# Patient Record
Sex: MALE | Race: WHITE | NOT HISPANIC OR LATINO | Employment: FULL TIME | URBAN - METROPOLITAN AREA
[De-identification: names, ages, dates, MRNs, and addresses within clinical notes are randomized per-mention and may not be internally consistent; named-entity substitution may affect disease eponyms.]

---

## 2017-05-15 ENCOUNTER — OFFICE VISIT (OUTPATIENT)
Dept: FAMILY MEDICINE | Facility: CLINIC | Age: 57
End: 2017-05-15
Payer: COMMERCIAL

## 2017-05-15 VITALS
SYSTOLIC BLOOD PRESSURE: 127 MMHG | WEIGHT: 194.6 LBS | DIASTOLIC BLOOD PRESSURE: 85 MMHG | HEART RATE: 63 BPM | TEMPERATURE: 97.5 F | BODY MASS INDEX: 27.86 KG/M2 | HEIGHT: 70 IN

## 2017-05-15 DIAGNOSIS — M76.61 ACHILLES TENDINITIS OF RIGHT LOWER EXTREMITY: Primary | ICD-10-CM

## 2017-05-15 PROCEDURE — 99213 OFFICE O/P EST LOW 20 MIN: CPT | Performed by: FAMILY MEDICINE

## 2017-05-15 NOTE — PROGRESS NOTES
"SUBJECTIVE:  Edin Padilla is a 56 year old male who sustained a left foot pain behind ankle   Following acute injury.  Mechanism of injury: 10 days   Immediate symptoms: immediate pain.   Symptoms have been improving since that time.  Pain Quality: Sharp  Modifying Factors:     Pain Improved with:rest    Pain Worsened with: standing on tip toes    Prior history of related problems: no prior problems with this area in the past.    Past Medical, social, family histories, medications, and allergies reviewed and updated    OBJECTIVE:  Blood pressure 127/85, pulse 63, temperature 97.5  F (36.4  C), temperature source Oral, height 5' 10\" (1.778 m), weight 194 lb 9.6 oz (88.3 kg).  Appearance: in no apparent distress and well developed and well nourished.  Foot/ankle exam.  .posterior ankle tender at achilles insertion.  Full range of motion     ICD-10-CM    1. Achilles tendinitis of right lower extremity M76.61     PLAN:Adams exercises    "

## 2017-05-15 NOTE — NURSING NOTE
"Chief Complaint   Patient presents with     Musculoskeletal Problem     right foot x 1 week, possible injury        Initial /85  Pulse 63  Temp 97.5  F (36.4  C) (Oral)  Ht 5' 10\" (1.778 m)  Wt 194 lb 9.6 oz (88.3 kg)  BMI 27.92 kg/m2 Estimated body mass index is 27.92 kg/(m^2) as calculated from the following:    Height as of this encounter: 5' 10\" (1.778 m).    Weight as of this encounter: 194 lb 9.6 oz (88.3 kg).  Medication Reconciliation: complete  Emilio Chin CMA    "

## 2017-05-15 NOTE — MR AVS SNAPSHOT
"              After Visit Summary   5/15/2017    Edin Padilla    MRN: [id number]           Patient Information     Date Of Birth          1960        Visit Information        Provider Department      5/15/2017 11:00 AM Amado Watson MD [facility]        Today's Diagnoses     Achilles tendinitis of right lower extremity    -  1       Follow-ups after your visit        Who to contact     If you have questions or need follow up information about today's clinic visit or your schedule please contact [facility] directly at [phone].  Normal or non-critical lab and imaging results will be communicated to you by "[employer]hart, letter or phone within 4 business days after the clinic has received the results. If you do not hear from us within 7 days, please contact the clinic through [employer] or phone. If you have a critical or abnormal lab result, we will notify you by phone as soon as possible.  Submit refill requests through [employer] or call your pharmacy and they will forward the refill request to us. Please allow 3 business days for your refill to be completed.          Additional Information About Your Visit        MyChart Information     Rev[employer] lets you send messages to your doctor, view your test results, renew your prescriptions, schedule appointments and more. To sign up, go to [email or web address] . Click on \"Log in\" on the left side of the screen, which will take you to the Welcome page. Then click on \"Sign up Now\" on the right side of the page.     You will be asked to enter the access code listed below, as well as some personal information. Please follow the directions to create your username and password.     Your access code is: [email or web address]  Expires: 2017 11:28 AM     Your access code will  in 90 days. If you need help or a new code, please call your [facility] or [phone].        Care EveryWhere ID     This is your Care EveryWhere ID. This " "could be used by other organizations to access your [place] medical records  [id number]        Your Vitals Were     Pulse Temperature Height BMI (Body Mass Index)          63 97.5  F (36.4  C) (Oral) 5' 10\" (1.778 m) 27.92 kg/m2         Blood Pressure from Last 3 Encounters:   05/15/17 127/85   05/28/15 124/80   12/17/14 142/87    Weight from Last 3 Encounters:   05/15/17 194 lb 9.6 oz (88.3 kg)   05/28/15 195 lb (88.5 kg)   12/17/14 190 lb (86.2 kg)              Today, you had the following     No orders found for display       Primary Care Provider Office Phone # Fax #    Amado Watson [phone]       [facility] [address] [facility] [address]        Thank you!     Thank you for choosing [facility]  for your care. Our goal is always to provide you with excellent care. Hearing back from our patients is one way we can continue to improve our services. Please take a few minutes to complete the written survey that you may receive in the mail after your visit with us. Thank you!             Your Updated Medication List - Protect others around you: Learn how to safely use, store and throw away your medicines at www.disposemymeds.org.      Notice  As of 5/15/2017 11:28 AM    You have not been prescribed any medications.      "

## 2017-05-22 ENCOUNTER — OFFICE VISIT (OUTPATIENT)
Dept: FAMILY MEDICINE | Facility: CLINIC | Age: 57
End: 2017-05-22
Payer: COMMERCIAL

## 2017-05-22 VITALS
DIASTOLIC BLOOD PRESSURE: 84 MMHG | BODY MASS INDEX: 27.75 KG/M2 | HEART RATE: 70 BPM | SYSTOLIC BLOOD PRESSURE: 127 MMHG | TEMPERATURE: 97.4 F | WEIGHT: 193.4 LBS

## 2017-05-22 DIAGNOSIS — Z23 NEED FOR TDAP VACCINATION: ICD-10-CM

## 2017-05-22 DIAGNOSIS — Z11.59 NEED FOR HEPATITIS C SCREENING TEST: ICD-10-CM

## 2017-05-22 DIAGNOSIS — M76.61 ACHILLES TENDINITIS OF RIGHT LOWER EXTREMITY: ICD-10-CM

## 2017-05-22 DIAGNOSIS — M10.9 ACUTE GOUT INVOLVING TOE OF RIGHT FOOT, UNSPECIFIED CAUSE: Primary | ICD-10-CM

## 2017-05-22 LAB — URATE SERPL-MCNC: 6.3 MG/DL (ref 3.5–7.2)

## 2017-05-22 PROCEDURE — 90471 IMMUNIZATION ADMIN: CPT | Performed by: FAMILY MEDICINE

## 2017-05-22 PROCEDURE — 90715 TDAP VACCINE 7 YRS/> IM: CPT | Performed by: FAMILY MEDICINE

## 2017-05-22 PROCEDURE — 84550 ASSAY OF BLOOD/URIC ACID: CPT | Performed by: FAMILY MEDICINE

## 2017-05-22 PROCEDURE — 99214 OFFICE O/P EST MOD 30 MIN: CPT | Mod: 25 | Performed by: FAMILY MEDICINE

## 2017-05-22 PROCEDURE — 36415 COLL VENOUS BLD VENIPUNCTURE: CPT | Performed by: FAMILY MEDICINE

## 2017-05-22 PROCEDURE — 86803 HEPATITIS C AB TEST: CPT | Performed by: FAMILY MEDICINE

## 2017-05-22 RX ORDER — INDOMETHACIN 25 MG/1
25 CAPSULE ORAL 2 TIMES DAILY WITH MEALS
Qty: 42 CAPSULE | Refills: 1 | Status: SHIPPED | OUTPATIENT
Start: 2017-05-22 | End: 2018-01-23

## 2017-05-22 NOTE — PROGRESS NOTES
SUBJECTIVE:  56 year old.The patient has a history of right foot and joint swelling and discomfort.  The patient was seen on 5/15/2017 for achilles tendonitis of the right lower extremity and was started on summit exercises.  Around 12 hours after being seen the patient had the onset of swelling to the joint that was quite painful.  It has started to improve but is still present.  The patient believes his symptoms started after doing [place] foundation [place].  Currently the patient denies any heel pain but states it does hurt when walking.  Currently the patient rates his toe pain as 4/10 when present but notes it has gotten better.  The patient has had some numbness and tingling in his right toes that has been going on for a week.  The patient also notes that his urine has been darker than usual and he has been pushing fluids.  ROS denies any fevers, cough, shortness of breath, abdominal pain, flank pain, change in bowel movements or other complaints.       Reviewed health maintenance  Patient Active Problem List   Diagnosis     CARDIOVASCULAR SCREENING; LDL GOAL LESS THAN 160     GERD (gastroesophageal reflux disease)     New onset atrial fibrillation (H)     Esophageal reflux     Advanced directives, counseling/discussion     Past Medical History:   Diagnosis Date     A-fib (H) 2008     History of cardioversion 2008, 2012     Vertigo        OBJECTIVE:  no apparent distress  /84  Pulse 70  Temp 97.4  F (36.3  C) (Oral)  Wt 193 lb 6.4 oz (87.7 kg)  BMI 27.75 kg/m2    Musc: Redness and swelling to the right great toe joint.  Tenderness to palpation.  No redness in other areas.  Left foot is unremarkable.        ICD-10-CM    1. Acute gout involving toe of right foot, unspecified cause M10.9 Uric acid     indomethacin (INDOCIN) 25 MG capsule   2. Need for hepatitis C screening test Z11.59 Hepatitis C Screen Reflex to HCV RNA Quant and Genotype   3. Achilles tendinitis of right lower extremity M76.61     4. Need for Tdap vaccination Z23 VACCINE ADMINISTRATION, INITIAL     TDAP VACCINE (ADACEL)     CANCELED: DTAP IMMUNIZATION (<7Y), IM      PLAN: indomethacin trial and await uric acid level     I, August Coto, am serving as a scribe on 5/22/2017 at 2:19 PM to personally document services performed by Dr. Watson based on my observations and the provider's statements to me.  I agree with the above plan  Amado Watson MD       Screening Questionnaire for Adult Immunization    Are you sick today?   No   Do you have allergies to medications, food, a vaccine component or latex?   No   Have you ever had a serious reaction after receiving a vaccination?   No   Do you have a long-term health problem with heart disease, lung disease, asthma, kidney disease, metabolic disease (e.g. diabetes), anemia, or other blood disorder?   No   Do you have cancer, leukemia, HIV/AIDS, or any other immune system problem?   No   In the past 3 months, have you taken medications that affect  your immune system, such as prednisone, other steroids, or anticancer drugs; drugs for the treatment of rheumatoid arthritis, Crohn s disease, or psoriasis; or have you had radiation treatments?   No   Have you had a seizure, or a brain or other nervous system problem?   No   During the past year, have you received a transfusion of blood or blood     products, or been given immune (gamma) globulin or antiviral drug?   No   For women: Are you pregnant or is there a chance you could become        pregnant during the next month?   No   Have you received any vaccinations in the past 4 weeks?   No     Immunization questionnaire answers were all negative.      MNVFC doesn't apply on this patient           Screening performed by Becca Rouse on 5/22/2017 at 2:47 PM.

## 2017-05-22 NOTE — NURSING NOTE
"Chief Complaint   Patient presents with     Musculoskeletal Problem     right sided foot pain, concerns with possible gout, heel pain not improved        Initial /84  Pulse 70  Temp 97.4  F (36.3  C) (Oral)  Wt 193 lb 6.4 oz (87.7 kg)  BMI 27.75 kg/m2 Estimated body mass index is 27.75 kg/(m^2) as calculated from the following:    Height as of 5/15/17: 5' 10\" (1.778 m).    Weight as of this encounter: 193 lb 6.4 oz (87.7 kg).  Medication Reconciliation: complete  Emilio Chin CMA    "

## 2017-05-22 NOTE — MR AVS SNAPSHOT
"              After Visit Summary   2017    Edin Padilla    MRN: [id number]           Patient Information     Date Of Birth          1960        Visit Information        Provider Department      2017 2:15 PM Amado Watson MD [facility]        Today's Diagnoses     Acute gout involving toe of right foot, unspecified cause    -  1    Need for hepatitis C screening test        Achilles tendinitis of right lower extremity        Need for Tdap vaccination           Follow-ups after your visit        Who to contact     If you have questions or need follow up information about today's clinic visit or your schedule please contact [facility] directly at [phone].  Normal or non-critical lab and imaging results will be communicated to you by MyChart, letter or phone within 4 business days after the clinic has received the results. If you do not hear from us within 7 days, please contact the clinic through MyChart or phone. If you have a critical or abnormal lab result, we will notify you by phone as soon as possible.  Submit refill requests through [employer] or call your pharmacy and they will forward the refill request to us. Please allow 3 business days for your refill to be completed.          Additional Information About Your Visit        MyChart Information     [employer] lets you send messages to your doctor, view your test results, renew your prescriptions, schedule appointments and more. To sign up, go to [email or web address] . Click on \"Log in\" on the left side of the screen, which will take you to the Welcome page. Then click on \"Sign up Now\" on the right side of the page.     You will be asked to enter the access code listed below, as well as some personal information. Please follow the directions to create your username and password.     Your access code is: [email or web address]  Expires: 2017 11:28 AM     Your access code will  in 90 days. If you need " help or a new code, please call your [place] clinic or [phone].        Care EveryWhere ID     This is your Care EveryWhere ID. This could be used by other organizations to access your [place] medical records  [id number]        Your Vitals Were     Pulse Temperature BMI (Body Mass Index)             70 97.4  F (36.3  C) (Oral) 27.75 kg/m2          Blood Pressure from Last 3 Encounters:   05/22/17 127/84   05/15/17 127/85   05/28/15 124/80    Weight from Last 3 Encounters:   05/22/17 193 lb 6.4 oz (87.7 kg)   05/15/17 194 lb 9.6 oz (88.3 kg)   05/28/15 195 lb (88.5 kg)              We Performed the Following     Hepatitis C Screen Reflex to HCV RNA Quant and Genotype     TDAP VACCINE (ADACEL)     Uric acid     VACCINE ADMINISTRATION, INITIAL          Today's Medication Changes          These changes are accurate as of: 5/22/17  4:36 PM.  If you have any questions, ask your nurse or doctor.               Start taking these medicines.        Dose/Directions    indomethacin 25 MG capsule   Commonly known as:  INDOCIN   Used for:  Acute gout involving toe of right foot, unspecified cause   Started by:  Amado Watson MD        Dose:  25 mg   Take 1 capsule (25 mg) by mouth 2 times daily (with meals)   Quantity:  42 capsule   Refills:  1            Where to get your medicines      These medications were sent to [facility] [id number] [place] [address], [address]  [address] [address] [address], [facility] [address]     Phone:  [phone]     indomethacin 25 MG capsule                Primary Care Provider Office Phone # Fax #    Amado Watson [phone]       [facility] [address] [facility] [address]        Thank you!     Thank you for choosing [facility]  for your care. Our goal is always to provide you with excellent care. Hearing back from our patients is one way we can continue to improve our services. Please take a few minutes  to complete the written survey that you may receive in the mail after your visit with us. Thank you!             Your Updated Medication List - Protect others around you: Learn how to safely use, store and throw away your medicines at www.disposemymeds.org.          This list is accurate as of: 5/22/17  4:36 PM.  Always use your most recent med list.                   Brand Name Dispense Instructions for use    indomethacin 25 MG capsule    INDOCIN    42 capsule    Take 1 capsule (25 mg) by mouth 2 times daily (with meals)

## 2017-05-22 NOTE — LETTER
[facility]  [address] [facility] [address] [phone]        May 25, 2017    Edin Padilla  [address] [facility] [address]            Dear Edin,    The results of your recent tests were normal.  Below is a copy of the results.  It was a pleasure to see you at your last appointment.    If you have any questions or concerns, please call myself or my nurse at [phone].    Sincerely,    Amado Watson MD/ks    Results for orders placed or performed in visit on 05/22/17   Hepatitis C Screen Reflex to HCV RNA Quant and Genotype   Result Value Ref Range    Hepatitis C Antibody  NR     Nonreactive   Assay performance characteristics have not been established for newborns,   infants, and children     Uric acid   Result Value Ref Range    Uric Acid 6.3 3.5 - 7.2 mg/dL

## 2017-05-23 LAB — HCV AB SERPL QL IA: NORMAL

## 2018-01-23 ENCOUNTER — OFFICE VISIT (OUTPATIENT)
Dept: OPTOMETRY | Facility: CLINIC | Age: 58
End: 2018-01-23
Payer: COMMERCIAL

## 2018-01-23 DIAGNOSIS — H52.4 PRESBYOPIA: ICD-10-CM

## 2018-01-23 DIAGNOSIS — H52.03 HYPEROPIA, BILATERAL: Primary | ICD-10-CM

## 2018-01-23 PROCEDURE — 92014 COMPRE OPH EXAM EST PT 1/>: CPT | Performed by: OPTOMETRIST

## 2018-01-23 PROCEDURE — 92015 DETERMINE REFRACTIVE STATE: CPT | Performed by: OPTOMETRIST

## 2018-01-23 ASSESSMENT — REFRACTION_MANIFEST
OS_ADD: +2.00
OS_SPHERE: +1.25
OS_CYLINDER: +0.50
OD_SPHERE: +2.00
OD_CYLINDER: SPHERE
OD_ADD: +2.00
OS_AXIS: 155
OD_AXIS: 012
OD_SPHERE: +1.75
OS_AXIS: 151
OS_SPHERE: +1.25
METHOD_AUTOREFRACTION: 1
OS_CYLINDER: +0.25
OD_CYLINDER: +0.25

## 2018-01-23 ASSESSMENT — VISUAL ACUITY
OS_CC: 20/20-1
OS_SC+: -1
OS_CC: 20/20
OD_SC: 20/60
OD_CC: 20/20
METHOD: SNELLEN - LINEAR
OS_SC: 20/200
OD_CC: 20/20-2
CORRECTION_TYPE: GLASSES
OD_SC: 20/200
OS_SC: 20/50
OD_SC+: -1

## 2018-01-23 ASSESSMENT — REFRACTION_WEARINGRX
SPECS_TYPE: PAL
OS_SPHERE: +1.25
OS_ADD: +2.00
OD_SPHERE: +1.50
OD_ADD: +2.00
OS_CYLINDER: SPHERE
OD_CYLINDER: SPHERE

## 2018-01-23 ASSESSMENT — TONOMETRY
OD_IOP_MMHG: 16
IOP_METHOD: APPLANATION

## 2018-01-23 ASSESSMENT — KERATOMETRY
OS_K1POWER_DIOPTERS: 42.25
OD_AXISANGLE2_DEGREES: 174
OS_AXISANGLE2_DEGREES: 11
OS_K2POWER_DIOPTERS: 42.75
OD_K1POWER_DIOPTERS: 42.25
OD_K2POWER_DIOPTERS: 42.75

## 2018-01-23 ASSESSMENT — CUP TO DISC RATIO
OD_RATIO: 0.2
OS_RATIO: 0.2

## 2018-01-23 ASSESSMENT — CONF VISUAL FIELD
OS_NORMAL: 1
METHOD: COUNTING FINGERS
OD_NORMAL: 1

## 2018-01-23 ASSESSMENT — SLIT LAMP EXAM - LIDS
COMMENTS: NORMAL
COMMENTS: NORMAL

## 2018-01-23 ASSESSMENT — EXTERNAL EXAM - RIGHT EYE: OD_EXAM: NORMAL

## 2018-01-23 ASSESSMENT — EXTERNAL EXAM - LEFT EYE: OS_EXAM: NORMAL

## 2018-01-23 NOTE — MR AVS SNAPSHOT
"              After Visit Summary   1/23/2018    Edin Padilla    MRN: [id number]           Patient Information     Date Of Birth          1960        Visit Information        Provider Department      1/23/2018 8:00 AM Ktai Petty, [facility]        Today's Diagnoses     Hyperopia, bilateral    -  1    Presbyopia          Care Instructions    Patient was advised of today's exam findings.  Fill glasses prescription  Allow 2 weeks to adapt to change in glasses  Use over the counter artificial tears 2 times a day as needed (Thera Tears, Systane Ultra or Refresh Optive)    Return in 1 year for eye exam    Kati Petty O.D.  [facility]   [address] [place], [place] [id number] [phone]            Follow-ups after your visit        Who to contact     If you have questions or need follow up information about today's clinic visit or your schedule please contact [facility] directly at [phone].  Normal or non-critical lab and imaging results will be communicated to you by [employer], letter or phone within 4 business days after the clinic has received the results. If you do not hear from us within 7 days, please contact the clinic through [employer] or phone. If you have a critical or abnormal lab result, we will notify you by phone as soon as possible.  Submit refill requests through [employer] or call your pharmacy and they will forward the refill request to us. Please allow 3 business days for your refill to be completed.          Additional Information About Your Visit        [employer] Information     [employer] lets you send messages to your doctor, view your test results, renew your prescriptions, schedule appointments and more. To sign up, go to [email or web address] . Click on \"Log in\" on the left side of the screen, which will take you to the Welcome page. Then click on \"Sign up Now\" on the right side of the page.     You will be asked to enter the access code " listed below, as well as some personal information. Please follow the directions to create your username and password.     Your access code is: [email or web address]  Expires: 2018  8:40 AM     Your access code will  in 90 days. If you need help or a new code, please call your [place] clinic or [phone].        Care EveryWhere ID     This is your Care EveryWhere ID. This could be used by other organizations to access your [place] medical records  [id number]         Blood Pressure from Last 3 Encounters:   17 127/84   05/15/17 127/85   05/28/15 124/80    Weight from Last 3 Encounters:   17 87.7 kg (193 lb 6.4 oz)   05/15/17 88.3 kg (194 lb 9.6 oz)   05/28/15 88.5 kg (195 lb)              Today, you had the following     No orders found for display       Primary Care Provider Office Phone # Fax #    Amado Watson [phone] [address] [facility] [address]        Equal Access to Services     [facility] HeartCHARLENE : Hadii aad ku hadasho Soomaali, waaxda luqadaha, qaybta kaalmada adeegyada, caleb anaya . So [facility] [phone].    ATENCIÓN: Si habla español, tiene a suggs disposición servicios gratuitos de asistencia lingüística. Llame al [phone].    We comply with applicable federal civil rights laws and [place] laws. We do not discriminate on the basis of race, color, national origin, age, disability, sex, sexual orientation, or gender identity.            Thank you!     Thank you for choosing [facility]  for your care. Our goal is always to provide you with excellent care. Hearing back from our patients is one way we can continue to improve our services. Please take a few minutes to complete the written survey that you may receive in the mail after your visit with us. Thank you!             Your Updated Medication List - Protect others around you: Learn how to safely use, store and throw away your medicines at  www.disposemymeds.org.      Notice  As of 1/23/2018  8:40 AM    You have not been prescribed any medications.

## 2018-01-23 NOTE — PROGRESS NOTES
Chief Complaint   Patient presents with     COMPREHENSIVE EYE EXAM         Last Eye Exam: 1/7/2015  Dilated Previously: Yes    What are you currently using to see?  Glasses or OTC Readers        Distance Vision Acuity: Noticed gradual change in both eyes    Near Vision Acuity: Satisfied with vision while reading and using computer with glasses, he thinks that there could have been a slight change     Eye Comfort: good and dry at times with very cold weather  Do you use eye drops? 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       OBJECTIVE: See Ophthalmology exam    ASSESSMENT:    ICD-10-CM    1. Hyperopia, bilateral H52.03 EYE EXAM (SIMPLE-NONBILLABLE)     REFRACTION   2. Presbyopia H52.4 EYE EXAM (SIMPLE-NONBILLABLE)     REFRACTION      PLAN:     Patient Instructions   Patient was advised of today's exam findings.  Fill glasses prescription  Allow 2 weeks to adapt to change in glasses  Use over the counter artificial tears 2 times a day as needed (Thera Tears, Systane Ultra or Refresh Optive)    Return in 1 year for eye exam    Kati Petty O.D.  [facility]   [address] [place] [address] [phone]

## 2018-01-23 NOTE — PATIENT INSTRUCTIONS
Patient was advised of today's exam findings.  Fill glasses prescription  Allow 2 weeks to adapt to change in glasses  Use over the counter artificial tears 2 times a day as needed (Thera Tears, Systane Ultra or Refresh Optive)    Return in 1 year for eye exam    Kati Petty O.D.  [facility]   [address] [place] [address] [phone]

## 2018-01-23 NOTE — LETTER
1/23/2018         RE: Edin Padilla  [address] [facility] [address]        Dear Colleague,    Thank you for referring your patient, Edin Padilla, to the [facility]. Please see a copy of my visit note below.    Chief Complaint   Patient presents with     COMPREHENSIVE EYE EXAM         Last Eye Exam: 1/7/2015  Dilated Previously: Yes    What are you currently using to see?  Glasses or OTC Readers        Distance Vision Acuity: Noticed gradual change in both eyes    Near Vision Acuity: Satisfied with vision while reading and using computer with glasses, he thinks that there could have been a slight change     Eye Comfort: good and dry at times with very cold weather  Do you use eye drops? 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       OBJECTIVE: See Ophthalmology exam    ASSESSMENT:    ICD-10-CM    1. Hyperopia, bilateral H52.03 EYE EXAM (SIMPLE-NONBILLABLE)     REFRACTION   2. Presbyopia H52.4 EYE EXAM (SIMPLE-NONBILLABLE)     REFRACTION      PLAN:     Patient Instructions   Patient was advised of today's exam findings.  Fill glasses prescription  Allow 2 weeks to adapt to change in glasses  Use over the counter artificial tears 2 times a day as needed (Thera Tears, Systane Ultra or Refresh Optive)    Return in 1 year for eye exam    Kati Petty O.D.  [facility]   [address] [place], [place] [id number] [phone]           Again, thank you for allowing me to participate in the care of your patient.        Sincerely,        Kati Petty, OD

## 2018-08-02 ENCOUNTER — OFFICE VISIT (OUTPATIENT)
Dept: FAMILY MEDICINE | Facility: CLINIC | Age: 58
End: 2018-08-02
Payer: COMMERCIAL

## 2018-08-02 VITALS
BODY MASS INDEX: 26.46 KG/M2 | WEIGHT: 189 LBS | OXYGEN SATURATION: 100 % | TEMPERATURE: 97.1 F | HEIGHT: 71 IN | DIASTOLIC BLOOD PRESSURE: 79 MMHG | RESPIRATION RATE: 16 BRPM | SYSTOLIC BLOOD PRESSURE: 123 MMHG | HEART RATE: 54 BPM

## 2018-08-02 DIAGNOSIS — Z13.220 LIPID SCREENING: ICD-10-CM

## 2018-08-02 DIAGNOSIS — R35.0 URINARY FREQUENCY: ICD-10-CM

## 2018-08-02 DIAGNOSIS — Z00.00 ROUTINE GENERAL MEDICAL EXAMINATION AT A HEALTH CARE FACILITY: Primary | ICD-10-CM

## 2018-08-02 DIAGNOSIS — Z13.1 SCREENING FOR DIABETES MELLITUS: ICD-10-CM
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
## 2018-08-02 DIAGNOSIS — K21.9 GASTROESOPHAGEAL REFLUX DISEASE WITHOUT ESOPHAGITIS: ICD-10-CM

## 2018-08-02 LAB
ALBUMIN UR-MCNC: NEGATIVE MG/DL
ANION GAP SERPL CALCULATED.3IONS-SCNC: 8 MMOL/L (ref 3–14)
APPEARANCE UR: CLEAR
BILIRUB UR QL STRIP: NEGATIVE
BUN SERPL-MCNC: 16 MG/DL (ref 7–30)
CALCIUM SERPL-MCNC: 9.3 MG/DL (ref 8.5–10.1)
CHLORIDE SERPL-SCNC: 104 MMOL/L (ref 94–109)
CHOLEST SERPL-MCNC: 176 MG/DL
CO2 SERPL-SCNC: 26 MMOL/L (ref 20–32)
COLOR UR AUTO: YELLOW
CREAT SERPL-MCNC: 1.02 MG/DL (ref 0.66–1.25)
GFR SERPL CREATININE-BSD FRML MDRD: 75 ML/MIN/1.7M2
GLUCOSE SERPL-MCNC: 90 MG/DL (ref 70–99)
GLUCOSE UR STRIP-MCNC: NEGATIVE MG/DL
HDLC SERPL-MCNC: 70 MG/DL
HGB UR QL STRIP: NEGATIVE
KETONES UR STRIP-MCNC: NEGATIVE MG/DL
LDLC SERPL CALC-MCNC: 97 MG/DL
LEUKOCYTE ESTERASE UR QL STRIP: NEGATIVE
MUCOUS THREADS #/AREA URNS LPF: PRESENT /LPF
NITRATE UR QL: NEGATIVE
NONHDLC SERPL-MCNC: 106 MG/DL
PH UR STRIP: 7.5 PH (ref 5–7)
POTASSIUM SERPL-SCNC: 4.5 MMOL/L (ref 3.4–5.3)
PSA SERPL-ACNC: 0.95 UG/L (ref 0–4)
RBC #/AREA URNS AUTO: ABNORMAL /HPF
SODIUM SERPL-SCNC: 138 MMOL/L (ref 133–144)
SOURCE: ABNORMAL
SP GR UR STRIP: 1.01 (ref 1–1.03)
TRIGL SERPL-MCNC: 46 MG/DL
UROBILINOGEN UR STRIP-ACNC: 0.2 EU/DL (ref 0.2–1)
WBC #/AREA URNS AUTO: ABNORMAL /HPF

## 2018-08-02 PROCEDURE — 80061 LIPID PANEL: CPT | Performed by: FAMILY MEDICINE

## 2018-08-02 PROCEDURE — G0103 PSA SCREENING: HCPCS | Performed by: FAMILY MEDICINE

## 2018-08-02 PROCEDURE — 80048 BASIC METABOLIC PNL TOTAL CA: CPT | Performed by: FAMILY MEDICINE

## 2018-08-02 PROCEDURE — 86618 LYME DISEASE ANTIBODY: CPT | Performed by: FAMILY MEDICINE

## 2018-08-02 PROCEDURE — 99396 PREV VISIT EST AGE 40-64: CPT | Performed by: FAMILY MEDICINE

## 2018-08-02 PROCEDURE — 99000 SPECIMEN HANDLING OFFICE-LAB: CPT | Performed by: FAMILY MEDICINE

## 2018-08-02 PROCEDURE — 81001 URINALYSIS AUTO W/SCOPE: CPT | Performed by: FAMILY MEDICINE

## 2018-08-02 PROCEDURE — 86617 LYME DISEASE ANTIBODY: CPT | Mod: 90 | Performed by: FAMILY MEDICINE

## 2018-08-02 PROCEDURE — 36415 COLL VENOUS BLD VENIPUNCTURE: CPT | Performed by: FAMILY MEDICINE

## 2018-08-02 RX ORDER — SUCRALFATE 1 G/1
1 TABLET ORAL 4 TIMES DAILY
Qty: 40 TABLET | Refills: 1 | Status: SHIPPED | OUTPATIENT
Start: 2018-08-02 | End: 2019-04-22

## 2018-08-02 ASSESSMENT — ENCOUNTER SYMPTOMS
DYSURIA: 0
CONSTIPATION: 0
DIZZINESS: 0
SORE THROAT: 0
SHORTNESS OF BREATH: 0
CHILLS: 0
JOINT SWELLING: 0
WEAKNESS: 0
PALPITATIONS: 0
EYE PAIN: 0
PARESTHESIAS: 0
ABDOMINAL PAIN: 0
HEMATOCHEZIA: 0
MYALGIAS: 1
HEARTBURN: 1
DIARRHEA: 0
HEADACHES: 0
NAUSEA: 0
ARTHRALGIAS: 0
FREQUENCY: 1
COUGH: 0
HEMATURIA: 0

## 2018-08-02 ASSESSMENT — PAIN SCALES - GENERAL: PAINLEVEL: NO PAIN (0)

## 2018-08-02 NOTE — LETTER
August 6, 2018    Edin Padilla  [id number] [address] [place] [address]            Dear Edin,    The results of your recent tests were normal.  Below is a copy of the results.  It was a pleasure to see you at your last appointment.    If you have any questions or concerns, please call myself or my nurse at [phone].    Sincerely,    Amado Watson MD/shl      Results for orders placed or performed in visit on 08/02/18   Lipid panel reflex to direct LDL Fasting   Result Value Ref Range    Cholesterol 176 <200 mg/dL    Triglycerides 46 <150 mg/dL    HDL Cholesterol 70 >39 mg/dL    LDL Cholesterol Calculated 97 <100 mg/dL    Non HDL Cholesterol 106 <130 mg/dL   Basic metabolic panel  (Ca, Cl, CO2, Creat, Gluc, K, Na, BUN)   Result Value Ref Range    Sodium 138 133 - 144 mmol/L    Potassium 4.5 3.4 - 5.3 mmol/L    Chloride 104 94 - 109 mmol/L    Carbon Dioxide 26 20 - 32 mmol/L    Anion Gap 8 3 - 14 mmol/L    Glucose 90 70 - 99 mg/dL    Urea Nitrogen 16 7 - 30 mg/dL    Creatinine 1.02 0.66 - 1.25 mg/dL    GFR Estimate 75 >60 mL/min/1.7m2    GFR Estimate If Black >90 >60 mL/min/1.7m2    Calcium 9.3 8.5 - 10.1 mg/dL   Lyme Disease Lucy with reflex to WB Serum   Result Value Ref Range    Lyme Disease Antibodies Serum 2.15 (H) 0.00 - 0.89   UA with Microscopic   Result Value Ref Range    Color Urine Yellow     Appearance Urine Clear     Glucose Urine Negative NEG^Negative mg/dL    Bilirubin Urine Negative NEG^Negative    Ketones Urine Negative NEG^Negative mg/dL    Specific Gravity Urine 1.015 1.003 - 1.035    pH Urine 7.5 (H) 5.0 - 7.0 pH    Protein Albumin Urine Negative NEG^Negative mg/dL    Urobilinogen Urine 0.2 0.2 - 1.0 EU/dL    Nitrite Urine Negative NEG^Negative    Blood Urine Negative NEG^Negative    Leukocyte Esterase Urine Negative NEG^Negative    Source Midstream Urine     WBC Urine 0 - 5 OTO5^0 - 5 /HPF    RBC Urine O - 2 OTO2^O - 2 /HPF    Mucous Urine Present (A) NEG^Negative /LPF   PSA, screen    Result Value Ref Range    PSA 0.95 0 - 4 ug/L   Lyme Conf IgG and IgM by Immunoblot   Result Value Ref Range    Lyme Confirm IgG by Immunoblot Negative Negative    Lyme Confirm IgM by Immunoblot Negative Negative

## 2018-08-02 NOTE — NURSING NOTE
"Chief Complaint   Patient presents with     Physical     Health Maintenance     order pended       Initial /79  Pulse 54  Temp 97.1  F (36.2  C) (Oral)  Resp 16  Ht 5' 11\" (1.803 m)  Wt 189 lb (85.7 kg)  SpO2 100%  BMI 26.36 kg/m2 Estimated body mass index is 26.36 kg/(m^2) as calculated from the following:    Height as of this encounter: 5' 11\" (1.803 m).    Weight as of this encounter: 189 lb (85.7 kg).  Medication Reconciliation: complete  Becca Rouse CMA  "

## 2018-08-02 NOTE — MR AVS SNAPSHOT
After Visit Summary   8/2/2018    Edin Padilla    MRN: [id number]           Patient Information     Date Of Birth          1960        Visit Information        Provider Department      8/2/2018 11:30 AM Amado Watson MD [facility]        Today's Diagnoses     Routine general medical examination at a health care facility    -  1    Gastroesophageal reflux disease without esophagitis        Screening for diabetes mellitus        Lipid screening        Tick bite, initial encounter        Urinary frequency          Care Instructions      Preventive Health Recommendations  Male Ages 50 - 64    Yearly exam:             See your health care provider every year in order to  o   Review health changes.   o   Discuss preventive care.    o   Review your medicines if your doctor has prescribed any.     Have a cholesterol test every 5 years, or more frequently if you are at risk for high cholesterol/heart disease.     Have a diabetes test (fasting glucose) every three years. If you are at risk for diabetes, you should have this test more often.     Have a colonoscopy at age 50, or have a yearly FIT test (stool test). These exams will check for colon cancer.      Talk with your health care provider about whether or not a prostate cancer screening test (PSA) is right for you.    You should be tested each year for STDs (sexually transmitted diseases), if you re at risk.     Shots: Get a flu shot each year. Get a tetanus shot every 10 years.     Nutrition:    Eat at least 5 servings of fruits and vegetables daily.     Eat whole-grain bread, whole-wheat pasta and brown rice instead of white grains and rice.     Get adequate Calcium and Vitamin D.     Lifestyle    Exercise for at least 150 minutes a week (30 minutes a day, 5 days a week). This will help you control your weight and prevent disease.     Limit alcohol to one drink per day.     No smoking.     Wear sunscreen to prevent skin  "cancer.     See your dentist every six months for an exam and cleaning.     See your eye doctor every 1 to 2 years.            Follow-ups after your visit        Who to contact     If you have questions or need follow up information about today's clinic visit or your schedule please contact [facility] directly at [phone].  Normal or non-critical lab and imaging results will be communicated to you by MyChart, letter or phone within 4 business days after the clinic has received the results. If you do not hear from us within 7 days, please contact the clinic through [employer] or phone. If you have a critical or abnormal lab result, we will notify you by phone as soon as possible.  Submit refill requests through [employer] or call your pharmacy and they will forward the refill request to us. Please allow 3 business days for your refill to be completed.          Additional Information About Your Visit        MyChart Information     [employer]Big lets you send messages to your doctor, view your test results, renew your prescriptions, schedule appointments and more. To sign up, go to [email or web address] . Click on \"Log in\" on the left side of the screen, which will take you to the Welcome page. Then click on \"Sign up Now\" on the right side of the page.     You will be asked to enter the access code listed below, as well as some personal information. Please follow the directions to create your username and password.     Your access code is: [email or web address]  Expires: 10/31/2018 10:48 AM     Your access code will  in 90 days. If you need help or a new code, please call your [facility] or [phone].        Care EveryWhere ID     This is your Care EveryWhere ID. This could be used by other organizations to access your [facility] medical records  [id number]        Your Vitals Were     Pulse Temperature Respirations Height Pulse Oximetry BMI (Body Mass Index)    54 97.1  F (36.2  C) (Oral) 16 5' 11\" (1.803 " m) 100% 26.36 kg/m2       Blood Pressure from Last 3 Encounters:   08/02/18 123/79   05/22/17 127/84   05/15/17 127/85    Weight from Last 3 Encounters:   08/02/18 189 lb (85.7 kg)   05/22/17 193 lb 6.4 oz (87.7 kg)   05/15/17 194 lb 9.6 oz (88.3 kg)              We Performed the Following     Basic metabolic panel  (Ca, Cl, CO2, Creat, Gluc, K, Na, BUN)     Lipid panel reflex to direct LDL Fasting     Lyme Disease Lucy with reflex to WB Serum     PSA, screen     UA with Microscopic          Today's Medication Changes          These changes are accurate as of 8/2/18 12:51 PM.  If you have any questions, ask your nurse or doctor.               Start taking these medicines.        Dose/Directions    sucralfate 1 GM tablet   Commonly known as:  CARAFATE   Used for:  Gastroesophageal reflux disease without esophagitis   Started by:  Amado Watson MD        Dose:  1 g   Take 1 tablet (1 g) by mouth 4 times daily   Quantity:  40 tablet   Refills:  1            Where to get your medicines      These medications were sent to [facility] [address], [address]  [id number] [place] [id number]     Phone:  [phone]     sucralfate 1 GM tablet                Primary Care Provider Office Phone # Fax #    Amado Watson [phone] [address] [facility] [address]        Equal Access to Services     SANJUANA DESAI : Hadii danielle ku hadasho Soomaali, waaxda luqadaha, qaybta kaalmada adeegyada, waxay jonathon kate. So [facility] [phone].    ATENCIÓN: Si habla español, tiene a suggs disposición servicios gratuitos de asistencia lingüística. Llame al [phone].    We comply with applicable federal civil rights laws and [place] laws. We do not discriminate on the basis of race, color, national origin, age, disability, sex, sexual orientation, or gender identity.            Thank you!     Thank you for choosing [facility]  for your care. Our goal is always to provide you with excellent care. Hearing back from our patients is one way we can continue to improve our services. Please take a few minutes to complete the written survey that you may receive in the mail after your visit with us. Thank you!             Your Updated Medication List - Protect others around you: Learn how to safely use, store and throw away your medicines at www.disposemymeds.org.          This list is accurate as of 8/2/18 12:51 PM.  Always use your most recent med list.                   Brand Name Dispense Instructions for use Diagnosis    sucralfate 1 GM tablet    CARAFATE    40 tablet    Take 1 tablet (1 g) by mouth 4 times daily    Gastroesophageal reflux disease without esophagitis

## 2018-08-02 NOTE — PROGRESS NOTES
"SUBJECTIVE:   CC: Edin Padilla is an 57 year old male who presents for preventative health visit.     Physical   Annual:     Getting at least 3 servings of Calcium per day:  Yes    Bi-annual eye exam:  Yes    Dental care twice a year:  NO    Sleep apnea or symptoms of sleep apnea:  Sleep apnea    Diet:  Regular (no restrictions)    Frequency of exercise:  2-3 days/week    Duration of exercise:  15-30 minutes    Taking medications regularly:  Yes    Medication side effects:  Not applicable    Additional concerns today:  YES        SUBJECTIVE:  57 year old.The patient has a history of gerd for many year.  Symptoms include heartburn. Caffeine intake 1 per day. Etoh none asa or nsaids non smoking n. Better with tums.  Worse with straining  Nausea n vomiting n. Endoscopy y  Reviewed health maintenance   Patient Active Problem List   Diagnosis     CARDIOVASCULAR SCREENING; LDL GOAL LESS THAN 160     GERD (gastroesophageal reflux disease)     New onset atrial fibrillation (H)     Esophageal reflux     Advanced directives, counseling/discussion       AM:601::\"Abdomen soft, non-tender. BS normal. No masses, organomegaly\"}          Today's PHQ-2 Score:   PHQ-2 ( 1999 [employer]) 8/2/2018   Q1: Little interest or pleasure in doing things 0   Q2: Feeling down, depressed or hopeless 0   PHQ-2 Score 0   Q1: Little interest or pleasure in doing things Not at all   Q2: Feeling down, depressed or hopeless Not at all   PHQ-2 Score 0       Abuse: Current or Past(Physical, Sexual or Emotional)- No  Do you feel safe in your environment - Yes    Social History   Substance Use Topics     Smoking status: Never Smoker     Smokeless tobacco: Never Used      Comment: no 2nd hand smoke exposure     Alcohol use 0.0 oz/week     0 Standard drinks or equivalent per week      Comment: 1/2 bottle of wine on Friday and Saturday evenings     Alcohol Use 8/2/2018   If you drink alcohol do you typically have greater than 3 drinks per day OR greater than " "7 drinks per week? No       Last PSA: No results found for: PSA    Reviewed orders with patient. Reviewed health maintenance and updated orders accordingly - Yes       Reviewed and updated as needed this visit by clinical staff  Tobacco  Allergies  Meds  Med Hx  Surg Hx  Fam Hx  Soc Hx        Reviewed and updated as needed this visit by Provider            Review of Systems   Constitutional: Negative for chills.   HENT: Positive for hearing loss. Negative for congestion, ear pain and sore throat.    Eyes: Negative for pain and visual disturbance.   Respiratory: Negative for cough and shortness of breath.    Cardiovascular: Negative for chest pain, palpitations and peripheral edema.   Gastrointestinal: Positive for heartburn. Negative for abdominal pain, constipation, diarrhea, hematochezia and nausea.   Genitourinary: Positive for frequency. Negative for discharge, dysuria, genital sores, hematuria, impotence and urgency.   Musculoskeletal: Positive for myalgias. Negative for arthralgias and joint swelling.   Skin: Negative for rash.   Neurological: Negative for dizziness, weakness, headaches and paresthesias.   Psychiatric/Behavioral: Negative for mood changes.       OBJECTIVE:   /79  Pulse 54  Temp 97.1  F (36.2  C) (Oral)  Resp 16  Ht 5' 11\" (1.803 m)  Wt 189 lb (85.7 kg)  SpO2 100%  BMI 26.36 kg/m2    Physical Exam  GENERAL: healthy, alert and no distress  EYES: Eyes grossly normal to inspection, PERRL and conjunctivae and sclerae normal  HENT: ear canals and TM's normal, nose and mouth without ulcers or lesions  NECK: no adenopathy, no asymmetry, masses, or scars and thyroid normal to palpation  RESP: lungs clear to auscultation - no rales, rhonchi or wheezes  CV: regular rate and rhythm, normal S1 S2, no S3 or S4, no murmur, click or rub, no peripheral edema and peripheral pulses strong  ABDOMEN: soft, nontender, no hepatosplenomegaly, no masses and bowel sounds normal  MS: no gross " "musculoskeletal defects noted, no edema  SKIN: no suspicious lesions or rashes  NEURO: Normal strength and tone, mentation intact and speech normal  PSYCH: mentation appears normal, affect normal/bright    Diagnostic Test Results:  pending    ASSESSMENT/PLAN:       ICD-10-CM    1. Routine general medical examination at a health care facility Z00.00    2. Gastroesophageal reflux disease without esophagitis K21.9    3. Screening for diabetes mellitus Z13.1    4. Lipid screening Z13.220        COUNSELING:   Reviewed preventive health counseling, as reflected in patient instructions       Regular exercise       Healthy diet/nutrition    BP Readings from Last 1 Encounters:   08/02/18 123/79     Estimated body mass index is 26.36 kg/(m^2) as calculated from the following:    Height as of this encounter: 5' 11\" (1.803 m).    Weight as of this encounter: 189 lb (85.7 kg).           reports that he has never smoked. He has never used smokeless tobacco.      Counseling Resources:  ATP IV Guidelines  Pooled Cohorts Equation Calculator  FRAX Risk Assessment  ICSI Preventive Guidelines  Dietary Guidelines for Americans, 2010  USDA's MyPlate  ASA Prophylaxis  Lung CA Screening    Amado Watson MD  [facility]  Answers for HPI/ROS submitted by the patient on 8/2/2018   PHQ-2 Score: 0    Trial of Carafate and consider Dr Oleary  "

## 2018-08-03 LAB — B BURGDOR IGG+IGM SER QL: 2.15 (ref 0–0.89)

## 2018-08-05 LAB
B BURGDOR IGG SER QL IB: NEGATIVE
B BURGDOR IGM SER QL IB: NEGATIVE

## 2019-04-22 ENCOUNTER — OFFICE VISIT (OUTPATIENT)
Dept: FAMILY MEDICINE | Facility: CLINIC | Age: 59
End: 2019-04-22
Payer: COMMERCIAL

## 2019-04-22 ENCOUNTER — ANCILLARY PROCEDURE (OUTPATIENT)
Dept: MRI IMAGING | Facility: CLINIC | Age: 59
End: 2019-04-22
Attending: FAMILY MEDICINE
Payer: COMMERCIAL

## 2019-04-22 VITALS
HEART RATE: 66 BPM | WEIGHT: 188 LBS | BODY MASS INDEX: 26.22 KG/M2 | SYSTOLIC BLOOD PRESSURE: 126 MMHG | DIASTOLIC BLOOD PRESSURE: 77 MMHG | TEMPERATURE: 98.9 F | OXYGEN SATURATION: 99 % | RESPIRATION RATE: 18 BRPM

## 2019-04-22 DIAGNOSIS — K21.00 GASTROESOPHAGEAL REFLUX DISEASE WITH ESOPHAGITIS: ICD-10-CM

## 2019-04-22 DIAGNOSIS — R51.9 NEW ONSET HEADACHE: Primary | ICD-10-CM

## 2019-04-22 DIAGNOSIS — R51.9 NEW ONSET HEADACHE: ICD-10-CM

## 2019-04-22 PROCEDURE — 99214 OFFICE O/P EST MOD 30 MIN: CPT | Performed by: FAMILY MEDICINE

## 2019-04-22 PROCEDURE — 70553 MRI BRAIN STEM W/O & W/DYE: CPT | Mod: TC

## 2019-04-22 PROCEDURE — A9585 GADOBUTROL INJECTION: HCPCS | Mod: JW

## 2019-04-22 RX ORDER — GADOBUTROL 604.72 MG/ML
8 INJECTION INTRAVENOUS ONCE
Status: COMPLETED | OUTPATIENT
Start: 2019-04-22 | End: 2019-04-22

## 2019-04-22 RX ADMIN — GADOBUTROL 8 ML: 604.72 INJECTION INTRAVENOUS at 16:55

## 2019-04-22 ASSESSMENT — PAIN SCALES - GENERAL: PAINLEVEL: NO PAIN (0)

## 2019-04-22 NOTE — NURSING NOTE
"Chief Complaint   Patient presents with     Headache     off and on - x 2 weeks      Dizziness       Initial /77   Pulse 66   Temp 98.9  F (37.2  C) (Oral)   Resp 18   Wt 85.3 kg (188 lb)   SpO2 99%   BMI 26.22 kg/m   Estimated body mass index is 26.22 kg/m  as calculated from the following:    Height as of 8/2/18: 1.803 m (5' 11\").    Weight as of this encounter: 85.3 kg (188 lb).  Medication Reconciliation: complete  Karon Kaplan M.A.    "

## 2019-04-22 NOTE — PROGRESS NOTES
SUBJECTIVE:  58 year old.The patient has a complaint of headaches.  This started 2 days ago. Location posterior quality has to hold the walls to walk.  Lasted 30 minutes Associated symptoms are unsteady and dizzy..  Brought on by unknown  .  Better with going to bed. ROS no chest pain or diaphorese. No visual problems      Reviewed health maintenance  Patient Active Problem List   Diagnosis     CARDIOVASCULAR SCREENING; LDL GOAL LESS THAN 160     GERD (gastroesophageal reflux disease)     New onset atrial fibrillation (H)     Esophageal reflux     Advanced directives, counseling/discussion     Past Medical History:   Diagnosis Date     A-fib (H) 2008     History of cardioversion 2008, 2012     Vertigo        OBJECTIVE:  no apparent distress  /77   Pulse 66   Temp 98.9  F (37.2  C) (Oral)   Resp 18   Wt 85.3 kg (188 lb)   SpO2 99%   BMI 26.22 kg/m    PERRLA and EOM intact  Motor   LUNGS:  CTA B/L, no wheezing or crackles.   Cardiovascular: negative, PMI normal. No lifts, heaves, or thrills. RRR. No murmurs, clicks gallops or rub   Gastrointestinal: Abdomen soft, non-tender. No masses, organomegaly       ICD-10-CM    1. New onset headache R51 MR Brain w/o & w Contrast   2. Gastroesophageal reflux disease with esophagitis K21.0 GENERAL SURG ADULT REFERRAL    PLAN: await MRI if normal Neurology          SUBJECTIVE:  58 year old.The patient has a history of gerd for many year.  Symptoms include heartburn. Caffeine intake one in am. Etoh hardly anything asa or nsaids rearely smoking n. Better with Zinc helps. .  Worse with NSAID'S, not better with antacid.  Not better with prilosec or carafate  Nausea n vomiting n. Endoscopy yes  Reviewed health maintenance   Patient Active Problem List   Diagnosis     CARDIOVASCULAR SCREENING; LDL GOAL LESS THAN 160     GERD (gastroesophageal reflux disease)     New onset atrial fibrillation (H)     Esophageal reflux     Advanced directives, counseling/discussion  "        OBJECTIVE:  Exam:  Constitutional: healthy, alert and no distress  Head: Normocephalic. No masses, lesions, tenderness or abnormalities  Neck: Neck supple. No adenopathy. Thyroid symmetric, normal size,, Carotids without bruits.  ENT: ENT exam normal, no neck nodes or sinus tenderness  Cardiovascular: negative, PMI normal. No lifts, heaves, or thrills. RRR. No murmurs, clicks gallops or rub  Respiratory: negative\",Good diaphragmatic excursion. Lungs clear  Gastrointestinal: Abdomen soft, non-tender. BS normal. No masses, organomegaly          ICD-10-CM    1. New onset headache R51 MR Brain w/o & w Contrast   2. Gastroesophageal reflux disease with esophagitis K21.0 GENERAL SURG ADULT REFERRAL    PLAN:await consult   "

## 2019-06-19 ENCOUNTER — TELEPHONE (OUTPATIENT)
Dept: FAMILY MEDICINE | Facility: CLINIC | Age: 59
End: 2019-06-19

## 2019-06-19 NOTE — TELEPHONE ENCOUNTER
Spoke with patient on the phone.  He will come in today to sign LUPE and consent to communicate giving us permission to speak to wife.

## 2020-02-16 ENCOUNTER — HEALTH MAINTENANCE LETTER (OUTPATIENT)
Age: 60
End: 2020-02-16

## 2020-03-25 ENCOUNTER — VIRTUAL VISIT (OUTPATIENT)
Dept: FAMILY MEDICINE | Facility: OTHER | Age: 60
End: 2020-03-25

## 2020-03-25 NOTE — PROGRESS NOTES
"Date: 2020 08:20:08  Clinician: Stacy Nicole  Clinician NPI: [id number]  Patient: Sharath Padilla  Patient : 1960  Patient Address: [id number] [place] [address]  Patient Phone: [phone]  Visit Protocol: URI  Patient Summary:  Sharath is a 59 year old ( : 1960 ) male who initiated a Visit for COVID-19 (Coronavirus) evaluation and screening. When asked the question \"Please sign me up to receive news, health information and promotions from [employer].\", Sharath responded \"No\".    Sharath states his symptoms started 1-2 days ago.   His symptoms consist of a headache and a cough. He is experiencing mild difficulty breathing with activities but can speak normally in full sentences.   Symptom details     Cough: Sharath coughs a few times an hour and his cough is not more bothersome at night. Phlegm does not come into his throat when he coughs. He does not believe his cough is caused by post-nasal drip.     Headache: He states the headache is mild (1-3 on a 10 point pain scale).      Sharath denies having enlarged lymph nodes, malaise, chills, ear pain, rhinitis, teeth pain, fever, facial pain or pressure, myalgias, wheezing, sore throat, and nasal congestion. He also denies taking antibiotic medication for the symptoms and having recent facial or sinus surgery in the past 60 days.   Precipitating events  He has not recently been exposed to someone with influenza. Sharath has not been in close contact with any high risk individuals.   Pertinent COVID-19 (Coronavirus) information  Sharath has not traveled internationally or to the areas where COVID-19 (Coronavirus) is widespread, including cruise ship travel in the last 14 days before the start of his symptoms.   Sharath has not had a close contact with a laboratory-confirmed COVID-19 patient within 14 days of symptom onset. He also has not had a close contact with a suspected COVID-19 patient within 14 days of symptom onset.   Sharath is not a " healthcare worker or a  and does not work in a healthcare facility. He is a family member of a healthcare worker or lives with someone who is a healthcare worker.   Pertinent medical history  Sharath does not need a return to work/school note.   Weight: 190 lbs   Sharath does not smoke or use smokeless tobacco.   Weight: 190 lbs    MEDICATIONS: No current medications, ALLERGIES: NKDA  Clinician Response:  Dear Sharath,   Based on the information you have provided, you do have symptoms that are consistent with Coronavirus (COVID-19).  The coronavirus causes mild to severe respiratory illness with the most common symptoms including fever, cough and difficulty breathing. Unfortunately, many viruses cause similar symptoms and it can be difficult to distinguish between viruses, especially in mild cases, so we are presuming that anyone with cough or fever has coronavirus at this time.  Coronavirus/COVID-19 has reached the point of community spread in [place], meaning that we are finding the virus in people with no known exposure risk for justine the virus. Given the increasing commonness of coronavirus in the community we are no longer testing patients who are not critically ill.  If you are a health care worker, you should refer to your employee health office for instructions about testing and returning to work.  For everyone else who has cough or fever, you should assume you are infected with coronavirus. Since you will not be tested but have symptoms that may be consistent with coronavirus, the CDC recommends you stay in self-isolation until these three things have happened:    You have had no fever for at least 72 hours (that is three full days of no fever without the use of medicine that reduces fevers)    AND   Other symptoms have improved (for example, when your cough or shortness of breath have improved)   AND   At least 7 days have passed since your symptoms first appeared.   How to Isolate:    Isolate yourself at home.  Do Not allow any visitors  Do Not go to work or school  Do Not go to Judaism,  centers, shopping, or other public places.  Do Not shake hands.  Avoid close contact with others (hugging, kissing).   Protect Others:   Cover Your Mouth and Nose with a mask, disposable tissue or wash cloth to avoid spreading germs to others.  Wash your hands and face frequently with soap and water.   We know it can be scary to hear that you might have COVID-19. Our team can help track your symptoms and make sure you are doing ok over the next two weeks using a program called [employer] to keep in touch. When you receive an email from [employer], please consider enrolling in our monitoring program. There is no cost to you for monitoring. Here is a URL where you can learn more: [email or web address]  Managing Symptoms:   At this time, we primarily recommend Tylenol (Acetaminophen) for fever or pain. If you have liver or kidney problems, contact your primary care provider for instructions on use of tylenol. Adults can take 650 mg (two 325 mg pills) by mouth every 4-6 hours as needed OR 1,000 mg (two 500 mg pills) every 8 hours as needed. MAXIMUM DAILY DOSE: 3,000mg. For children, refer to dosing on bottle based on age or weight.   If you develop significant shortness of breath that prevents you from doing normal activities, please call 911 or proceed to the nearest emergency room and alert them immediately that you have been in self-isolation for possible coronavirus.  For more information about COVID19 and options for caring for yourself at home, please visit the CDC website at https://www.cdc.gov/coronavirus/2019-ncov/about/steps-when-sick.htmlFor more options for care at [facility], please visit our website at [email or web address]    COVID-19 (Coronavirus) General Information  With the increase in the number of COVID-19 (Coronavirus) cases, we understand  you may have some questions. Below is some helpful information on COVID-19 (Coronavirus).  How can I protect myself and others from the COVID-19 (Coronavirus)?  Because there is currently no vaccine to prevent infection, the best way to protect yourself is to avoid being exposed to this virus. Put distance between yourself and other people if COVID-19 (Coronavirus) is spreading in your community. The virus is thought to spread mainly from person-to-person.     Between people who are in close contact with one another (within about 6 about) for a prolonged period (10 minutes or longer).    Through respiratory droplets produced when an infected person coughs or sneezes.     The CDC recommends the following additional steps to protect yourself and others:     Wash your hands often with soap and water for at least 20 seconds, especially after blowing your nose, coughing, or sneezing; going to the bathroom; and before eating or preparing food.  Use an alcohol-based hand  that contains at least 60 percent alcohol if soap and water are not available.        Avoid touching your eyes, nose and mouth with unwashed hands.    Avoid close contact with people who are sick.    Stay home when you are sick.    Cover your cough or sneeze with a tissue, then throw the tissue in the trash.    Clean and disinfect frequently touched objects and surfaces.     You can help stop COVID-19 (Coronavirus) by knowing the signs and symptoms:     Fever    Cough    Shortness of breath     Contact your healthcare provider if   Develop symptoms   AND   Have been in close contact with a person known to have COVID-19 (Coronavirus) or live in or have recently traveled from an area with ongoing spread of COVID-19 (Coronavirus). Call ahead before you go to a doctor's office or emergency room. Tell them about your recent travel and your symptoms.   For the most up to date information, visit the CDC's website.  Self-monitoring  Self-monitoring means  people should monitor themselves for fever by taking their temperatures twice a day and remain alert for a cough or difficulty breathing.  It is important to check your health two times each day for 14 days after a potential exposure to a person with COVID-19 (Coronavirus) or after travel from a location where COVID-19 (Coronavirus) is widespread. If you have been exposed to a person with COVID-19 (Coronavirus), it may take up to 14 days to know if you will get sick. Follow the steps below to check and record your health.     Take your temperature with a thermometer twice a day, once in the morning and once in the evening, and watch for a cough or difficulty breathing for 14 days.    Write down your temperature and any COVID-19 symptoms you may have: feeling feverish, coughing, or difficulty breathing.    Stay home from work or school.    Do not take public transportation, taxis, or ride-shares.    Avoid crowded places (such as shopping centers and movie theaters) and limit your activities in public.    Keep your distance from others (about 6 feet or 2 meters).    If you get sick with fever, cough, or trouble breathing, contact your healthcare provider and tell them about your recent travel and/or your symptoms.    If you need to seek medical care for other reasons, such as dialysis, call ahead to your doctor and tell them about your recent travel.     Steps to help prevent the spread of COVID-19 (Coronavirus) if you are sick  If you are sick with COVID-19 (Coronavirus) or suspect you are infected with the virus that causes COVID-19 (Coronavirus), follow the steps below to help prevent the disease from spreading&nbsp;to people in your home and community.     Stay home except to get medical care. Home isolation may be started in consultation with your healthcare clinician.    Separate yourself from other people and animals in your home.    Call ahead before visiting your doctor if you have a medical appointment.     "Wear a facemask when you are around other people.    Cover your cough and sneezes.    Clean your hands often.    Avoid sharing personal household items.    Clean and disinfect frequently touched objects and surfaces everyday.    You will need to have someone drop off medications or household supplies (if needed) at your house without coming inside or in contact with you or others living in your house.    Monitor your symptoms and seek prompt medical care if your illness is worsening (e.g. Difficulty breathing).    Discontinue home isolation only in consultation with your healthcare provider.     For more detailed and up to date information on what to do if you are sick, visit this link: What to Do If You Are Sick With COVID-19.  Do I need to be tested for COVID-19 (Coronavirus)?     Not everyone needs to be tested for COVID-19 (Coronavirus). Decisions on which patients receive testing will be based on the local spread of COVID-19 (Coronavirus) as well as the symptoms. Your healthcare provider will make the final decision on whether you should be tested.    In the meantime, if you have concerns that you may have been exposed, it is reasonable to practice \"social distancing.\"&nbsp; If you are ill with a cold or flu-like illness, please monitor your symptoms and call your healthcare provider if your symptoms worsen.    For more up to date information, visit this link: COVID-19 (Coronavirus) Frequently Asked Questions and Answers.      Diagnosis: Cough  Diagnosis ICD: R05  "

## 2020-04-17 ENCOUNTER — OFFICE VISIT (OUTPATIENT)
Dept: FAMILY MEDICINE | Facility: CLINIC | Age: 60
End: 2020-04-17
Payer: COMMERCIAL

## 2020-04-17 ENCOUNTER — ANCILLARY PROCEDURE (OUTPATIENT)
Dept: GENERAL RADIOLOGY | Facility: CLINIC | Age: 60
End: 2020-04-17
Attending: PHYSICIAN ASSISTANT
Payer: COMMERCIAL

## 2020-04-17 ENCOUNTER — VIRTUAL VISIT (OUTPATIENT)
Dept: FAMILY MEDICINE | Facility: CLINIC | Age: 60
End: 2020-04-17
Payer: COMMERCIAL

## 2020-04-17 ENCOUNTER — NURSE TRIAGE (OUTPATIENT)
Dept: NURSING | Facility: CLINIC | Age: 60
End: 2020-04-17

## 2020-04-17 VITALS
RESPIRATION RATE: 20 BRPM | WEIGHT: 195.2 LBS | BODY MASS INDEX: 27.94 KG/M2 | HEIGHT: 70 IN | OXYGEN SATURATION: 99 % | SYSTOLIC BLOOD PRESSURE: 146 MMHG | HEART RATE: 68 BPM | DIASTOLIC BLOOD PRESSURE: 96 MMHG | TEMPERATURE: 96.7 F

## 2020-04-17 DIAGNOSIS — R03.0 ELEVATED BP WITHOUT DIAGNOSIS OF HYPERTENSION: ICD-10-CM

## 2020-04-17 DIAGNOSIS — R06.09 DOE (DYSPNEA ON EXERTION): Primary | ICD-10-CM

## 2020-04-17 DIAGNOSIS — R06.02 SOB (SHORTNESS OF BREATH): Primary | ICD-10-CM

## 2020-04-17 DIAGNOSIS — R06.09 DOE (DYSPNEA ON EXERTION): ICD-10-CM

## 2020-04-17 LAB
ALBUMIN SERPL-MCNC: 4.6 G/DL (ref 3.4–5)
ALP SERPL-CCNC: 73 U/L (ref 40–150)
ALT SERPL W P-5'-P-CCNC: 60 U/L (ref 0–70)
ANION GAP SERPL CALCULATED.3IONS-SCNC: 3 MMOL/L (ref 3–14)
AST SERPL W P-5'-P-CCNC: 29 U/L (ref 0–45)
BASOPHILS # BLD AUTO: 0 10E9/L (ref 0–0.2)
BASOPHILS NFR BLD AUTO: 0.5 %
BILIRUB SERPL-MCNC: 0.5 MG/DL (ref 0.2–1.3)
BUN SERPL-MCNC: 15 MG/DL (ref 7–30)
CALCIUM SERPL-MCNC: 9.5 MG/DL (ref 8.5–10.1)
CHLORIDE SERPL-SCNC: 105 MMOL/L (ref 94–109)
CO2 SERPL-SCNC: 29 MMOL/L (ref 20–32)
CREAT SERPL-MCNC: 0.95 MG/DL (ref 0.66–1.25)
D DIMER PPP FEU-MCNC: <0.3 UG/ML FEU (ref 0–0.5)
DIFFERENTIAL METHOD BLD: NORMAL
EOSINOPHIL # BLD AUTO: 0.1 10E9/L (ref 0–0.7)
EOSINOPHIL NFR BLD AUTO: 1.4 %
ERYTHROCYTE [DISTWIDTH] IN BLOOD BY AUTOMATED COUNT: 12.3 % (ref 10–15)
GFR SERPL CREATININE-BSD FRML MDRD: 87 ML/MIN/{1.73_M2}
GLUCOSE SERPL-MCNC: 96 MG/DL (ref 70–99)
HCT VFR BLD AUTO: 47.7 % (ref 40–53)
HGB BLD-MCNC: 16.3 G/DL (ref 13.3–17.7)
LYMPHOCYTES # BLD AUTO: 2.2 10E9/L (ref 0.8–5.3)
LYMPHOCYTES NFR BLD AUTO: 27 %
MCH RBC QN AUTO: 28.6 PG (ref 26.5–33)
MCHC RBC AUTO-ENTMCNC: 34.2 G/DL (ref 31.5–36.5)
MCV RBC AUTO: 84 FL (ref 78–100)
MONOCYTES # BLD AUTO: 0.5 10E9/L (ref 0–1.3)
MONOCYTES NFR BLD AUTO: 5.6 %
NEUTROPHILS # BLD AUTO: 5.2 10E9/L (ref 1.6–8.3)
NEUTROPHILS NFR BLD AUTO: 65.5 %
NT-PROBNP SERPL-MCNC: 35 PG/ML (ref 0–125)
PLATELET # BLD AUTO: 235 10E9/L (ref 150–450)
POTASSIUM SERPL-SCNC: 4 MMOL/L (ref 3.4–5.3)
PROT SERPL-MCNC: 8.3 G/DL (ref 6.8–8.8)
RBC # BLD AUTO: 5.7 10E12/L (ref 4.4–5.9)
SODIUM SERPL-SCNC: 137 MMOL/L (ref 133–144)
TROPONIN I SERPL-MCNC: <0.015 UG/L (ref 0–0.04)
TSH SERPL DL<=0.005 MIU/L-ACNC: 2.93 MU/L (ref 0.4–4)
WBC # BLD AUTO: 8 10E9/L (ref 4–11)

## 2020-04-17 PROCEDURE — 83880 ASSAY OF NATRIURETIC PEPTIDE: CPT | Performed by: PHYSICIAN ASSISTANT

## 2020-04-17 PROCEDURE — 80050 GENERAL HEALTH PANEL: CPT | Performed by: PHYSICIAN ASSISTANT

## 2020-04-17 PROCEDURE — 93000 ELECTROCARDIOGRAM COMPLETE: CPT | Performed by: PHYSICIAN ASSISTANT

## 2020-04-17 PROCEDURE — 99214 OFFICE O/P EST MOD 30 MIN: CPT | Performed by: PHYSICIAN ASSISTANT

## 2020-04-17 PROCEDURE — 85379 FIBRIN DEGRADATION QUANT: CPT | Performed by: PHYSICIAN ASSISTANT

## 2020-04-17 PROCEDURE — 36415 COLL VENOUS BLD VENIPUNCTURE: CPT | Performed by: PHYSICIAN ASSISTANT

## 2020-04-17 PROCEDURE — 71046 X-RAY EXAM CHEST 2 VIEWS: CPT

## 2020-04-17 PROCEDURE — 84484 ASSAY OF TROPONIN QUANT: CPT | Performed by: PHYSICIAN ASSISTANT

## 2020-04-17 PROCEDURE — 99213 OFFICE O/P EST LOW 20 MIN: CPT | Performed by: PHYSICIAN ASSISTANT

## 2020-04-17 RX ORDER — ALBUTEROL SULFATE 90 UG/1
2 AEROSOL, METERED RESPIRATORY (INHALATION) EVERY 6 HOURS
Qty: 1 INHALER | Refills: 1 | Status: SHIPPED | OUTPATIENT
Start: 2020-04-17 | End: 2024-05-01

## 2020-04-17 ASSESSMENT — MIFFLIN-ST. JEOR: SCORE: 1709.16

## 2020-04-17 NOTE — PROGRESS NOTES
"Edin Padilla is a 59 year old male who is being evaluated via a billable video visit.      The patient has been notified of following:     \"This video visit will be conducted via a call between you and your physician/provider. We have found that certain health care needs can be provided without the need for an in-person physical exam.  This service lets us provide the care you need with a video conversation.  If a prescription is necessary we can send it directly to your pharmacy.  If lab work is needed we can place an order for that and you can then stop by our lab to have the test done at a later time.    Video visits are billed at different rates depending on your insurance coverage.  Please reach out to your insurance provider with any questions.    If during the course of the call the physician/provider feels a video visit is not appropriate, you will not be charged for this service.\"    Patient has given verbal consent for Video visit? Yes    How would you like to obtain your AVS? Priscila    Patient would like the video invitation sent by: Text to cell phone: [phone]        Additional provider notes:      Shortness of breath x 4 weeks, occasional cough. Happening with any activity. Feels like lung capacity is reduced  Talk to patient through video visit about his concerns of.  He is concerned that this is just not getting better up.  He states he has an occasional cough but he said he does not feel ill and never really did a.  He just really concerned about this shortness of breath that is not getting better.  He denies any chest pain he denies any wheezing he denies any lightheadedness or dizziness.  He is never had symptoms like this before.  He states he can not really exert himself at all as the shortness of breath worsens.  Occasionally wake him up at night.  He otherwise does not feel ill.    During my video visit he appeared to be alert and orientated x3.  He did not elicit any noticeable " shortness of breath he was able to talk in complete sentences.  He did not appear ill.  There is no signs of any respiratory distress or retracting.          ICD-10-CM    1. SOB (shortness of breath)  R06.02       At this time we talked about having him come into the clinic for physical exam based on his symptoms.  He does have a history of atrial fibrillation in the past he states this does not feel like that but that can be of concern.  I will have my support staff give him a call to schedule him for him visit in the clinic.  Warning signs were discussed if it dramatically worsens he should proceed to be seen urgently.    Video-Visit Details    Type of service:  Video Visit    Video time: about 10 minutes    Originating Location (pt. Location): Home    Distant Location (provider location):  [facility]     Mode of Communication:  Video Conference via [employer]      Joe Narvaez PA-C

## 2020-04-17 NOTE — TELEPHONE ENCOUNTER
COVID 19 Nurse Triage Plan/Patient Instructions    Please be aware that novel coronavirus (COVID-19) may be circulating in the community. If you develop symptoms such as fever, cough, or SOB or if you have concerns about the presence of another infection including coronavirus (COVID-19), please contact your health care provider or visit [email or web address].     Disposition/Instructions    Patient to have an [facility] Visit with a provider (Preferred option). Follow System Ambulatory Workflow for COVID 19.     To do this follow these instructions:    1. Go to the website [email or web address]  2. Create an account (you will need your insurance information)  3. Start a new visit  4. Choose your diagnosis (e.g. COVID19)  5. Fill out the information about your symptoms  6. A provider will reach out to you by text, phone call or video visit based on your request    Call Back If: Your symptoms worsen before you are able to complete your [facility] Visit with a provider.    Thank you for limiting contact with others, wearing a simple mask to cover your cough, practice good hand hygiene habits and accessing our virtual services where possible to limit the spread of this virus.    For more information about COVID19 and options for caring for yourself at home, please visit the CDC website at https://www.cdc.gov/coronavirus/2019-ncov/about/steps-when-sick.html  For more options for care at [facility], please visit our website at [email or web address]    For more information, please use the [facility] ([facility]) COVID-19 Hotlines (Interpreters available):     Health questions: Phone Number: [phone] or [phone] and Hours: 7 a.m. to 7 p.m.    Schools and  questions: Phone Number: [phone] or [phone] and Hours 7 a.m. to 7 p.m.      Patient states he did [facility] a while ago and it went no where. He is wanting a regular clinic visit. I connected with scheduling for  them to set up a clinic telephone visit.  Verenice Merritt RN  [facility]              Reason for Disposition    MILD difficulty breathing (e.g., minimal/no SOB at rest, SOB with walking, pulse <100)    Additional Information    Negative: SEVERE difficulty breathing (e.g., struggling for each breath, speaks in single words)    Negative: Difficult to awaken or acting confused (e.g., disoriented, slurred speech)    Negative: Bluish (or gray) lips or face now    Negative: Shock suspected (e.g., cold/pale/clammy skin, too weak to stand, low BP, rapid pulse)    Negative: Sounds like a life-threatening emergency to the triager    Negative: [1] COVID-19 suspected (e.g., cough, fever, shortness of breath) AND [2] public health department recommends testing    Negative: [1] COVID-19 exposure AND [2] no symptoms    Negative: COVID-19 and Breastfeeding, questions about    Negative: SEVERE or constant chest pain (Exception: mild central chest pain, present only when coughing)    Negative: MODERATE difficulty breathing (e.g., speaks in phrases, SOB even at rest, pulse 100-120)    Negative: Patient sounds very sick or weak to the triager    Protocols used: CORONAVIRUS (COVID-19) DIAGNOSED OR [id number] 3.30.20

## 2020-04-17 NOTE — PROGRESS NOTES
Subjective     Edin Padilla is a 59 year old male who presents to clinic today for the following health issues:    HPI   Concern - Breathing Problems  Onset: 4 weeks    Description:   Came on, no other symptoms    Intensity: moderate    Progression of Symptoms:  Improving, lung capacity has not improved for the last two weeks    Accompanying Signs & Symptoms:  Slight cough in the beginning  No fevers   No body aches  H/O allergies and asthma in the past when younger -- this feels different    Previous history of similar problem:   none    Precipitating factors:   Worsened by: coughing, breathing heavy    Alleviating factors:  Improved by: none    Therapies Tried and outcome: none  No cold symptoms . No fevers. No wheezing.   Feels like his full breath is not effective. Some dyspnea with exertion. No leg swelling. No chest pain. No palpitations.   No fhx of heart disease.   No bowel changes. No diarrhea or constipation.   No cough. No gerd symptoms. No blood in stools. No weight loss. No drenching noc sweats.     Patient Active Problem List   Diagnosis     CARDIOVASCULAR SCREENING; LDL GOAL LESS THAN 160     GERD (gastroesophageal reflux disease)     New onset atrial fibrillation (H)     Esophageal reflux     Advanced directives, counseling/discussion     Past Surgical History:   Procedure Laterality Date     COLONOSCOPY       ESOPHAGOSCOPY, GASTROSCOPY, DUODENOSCOPY (EGD), COMBINED  1/7/2014    Procedure: COMBINED ESOPHAGOSCOPY, GASTROSCOPY, DUODENOSCOPY (EGD), BIOPSY SINGLE OR MULTIPLE;  EGD-UPPER GI SYPTOMS / LEISA;  Surgeon: Mukund Au MD;  Location:  OR     TONSILLECTOMY & ADENOIDECTOMY         Social History     Tobacco Use     Smoking status: Never Smoker     Smokeless tobacco: Never Used     Tobacco comment: no 2nd hand smoke exposure   Substance Use Topics     Alcohol use: Yes     Alcohol/week: 0.0 standard drinks     Comment: 1/2 bottle of wine on Friday and Saturday evenings     Family  History   Problem Relation Age of Onset     Cancer Mother         lymphoma     Thyroid Disease Mother      Hypertension Father      Cerebrovascular Disease Paternal Grandmother      Diabetes No family hx of      Glaucoma No family hx of      Macular Degeneration No family hx of          No current outpatient medications on file.     Allergies   Allergen Reactions     Other [Seasonal Allergies]      Hayfever. Eye irritation. Nasal discharge.     Recent Labs   Lab Test 08/02/18  1139   LDL 97   HDL 70   TRIG 46   CR 1.02   GFRESTIMATED 75   GFRESTBLACK >90   POTASSIUM 4.5      BP Readings from Last 3 Encounters:   04/17/20 (!) 146/96   04/22/19 126/77   08/02/18 123/79    Wt Readings from Last 3 Encounters:   04/17/20 88.5 kg (195 lb 3.2 oz)   04/22/19 85.3 kg (188 lb)   08/02/18 85.7 kg (189 lb)                      Reviewed and updated as needed this visit by Provider         Review of Systems   ROS COMP: Constitutional, HEENT, cardiovascular, pulmonary, GI, , musculoskeletal, neuro, skin, endocrine and psych systems are negative, except as otherwise noted.      Objective    There were no vitals taken for this visit.  There is no height or weight on file to calculate BMI.  Physical Exam

## 2020-04-17 NOTE — PROGRESS NOTES
Called and spoke to the patient @ [phone]. I have scheduled the patient to see Fortino Arias PA-C at [facility] 04/17/2020.  Sunshine Vicente TC

## 2021-04-10 ENCOUNTER — HEALTH MAINTENANCE LETTER (OUTPATIENT)
Age: 61
End: 2021-04-10

## 2021-09-10 ENCOUNTER — TRANSFERRED RECORDS (OUTPATIENT)
Dept: HEALTH INFORMATION MANAGEMENT | Facility: CLINIC | Age: 61
End: 2021-09-10

## 2021-09-19 ENCOUNTER — HEALTH MAINTENANCE LETTER (OUTPATIENT)
Age: 61
End: 2021-09-19

## 2022-02-07 DIAGNOSIS — Z12.11 COLON CANCER SCREENING: Primary | ICD-10-CM

## 2022-02-17 ENCOUNTER — MYC MEDICAL ADVICE (OUTPATIENT)
Dept: FAMILY MEDICINE | Facility: CLINIC | Age: 62
End: 2022-02-17
Payer: COMMERCIAL

## 2022-02-17 DIAGNOSIS — Z12.11 COLON CANCER SCREENING: Primary | ICD-10-CM

## 2022-02-17 NOTE — TELEPHONE ENCOUNTER
Routed patient MyChart  question to Dr. Watson to advise.    Jena Pena     [facility] Yue Greenberg

## 2022-03-08 ENCOUNTER — OFFICE VISIT (OUTPATIENT)
Dept: OPTOMETRY | Facility: CLINIC | Age: 62
End: 2022-03-08
Payer: COMMERCIAL

## 2022-03-08 DIAGNOSIS — H52.4 PRESBYOPIA: ICD-10-CM

## 2022-03-08 DIAGNOSIS — H52.223 REGULAR ASTIGMATISM OF BOTH EYES: ICD-10-CM

## 2022-03-08 DIAGNOSIS — H52.03 HYPEROPIA, BILATERAL: Primary | ICD-10-CM

## 2022-03-08 PROCEDURE — 92004 COMPRE OPH EXAM NEW PT 1/>: CPT | Performed by: OPTOMETRIST

## 2022-03-08 PROCEDURE — 92015 DETERMINE REFRACTIVE STATE: CPT | Performed by: OPTOMETRIST

## 2022-03-08 ASSESSMENT — KERATOMETRY
OS_AXISANGLE2_DEGREES: 26
OD_K1POWER_DIOPTERS: 42.00
OD_AXISANGLE2_DEGREES: 171
OD_K2POWER_DIOPTERS: 42.75
OS_K2POWER_DIOPTERS: 42.50
OS_K1POWER_DIOPTERS: 42.00

## 2022-03-08 ASSESSMENT — REFRACTION_WEARINGRX
OS_ADD: +2.00
OS_AXIS: 155
OD_CYLINDER: SPHERE
OD_SPHERE: +1.50
OD_SPHERE: +2.00
OS_CYLINDER: +0.25
OS_ADD: +2.00
SPECS_TYPE: PAL
OS_SPHERE: +1.25
OS_SPHERE: +1.25
OS_CYLINDER: SPHERE
OD_ADD: +2.00
SPECS_TYPE: PAL
OD_CYLINDER: SPHERE
OD_ADD: +2.00

## 2022-03-08 ASSESSMENT — VISUAL ACUITY
OD_CC+: -1
OD_CC: 20/20-1
OS_SC: 20/70
OS_CC: 20/20-3
METHOD: SNELLEN - LINEAR
OD_SC+: -2
OS_CC: 20/20
CORRECTION_TYPE: GLASSES
OD_SC: 20/100
OD_CC: 20/20
OS_SC+: -1

## 2022-03-08 ASSESSMENT — REFRACTION_MANIFEST
OD_SPHERE: +2.25
OS_SPHERE: +1.25
OD_CYLINDER: +0.25
OS_ADD: +2.25
OS_AXIS: 167
OS_CYLINDER: +0.75
OD_SPHERE: +2.00
OD_CYLINDER: +0.50
OD_AXIS: 018
OS_AXIS: 160
OD_ADD: +2.25
OD_AXIS: 025
OS_SPHERE: +1.75
OD_CYLINDER: SPHERE
OD_SPHERE: +1.50
OS_CYLINDER: +0.75
METHOD_AUTOREFRACTION: 1
OS_SPHERE: +1.50
OS_CYLINDER: SPHERE

## 2022-03-08 ASSESSMENT — CONF VISUAL FIELD
OD_NORMAL: 1
METHOD: COUNTING FINGERS
OS_NORMAL: 1

## 2022-03-08 ASSESSMENT — TONOMETRY
OS_IOP_MMHG: 20
OD_IOP_MMHG: 19
IOP_METHOD: APPLANATION

## 2022-03-08 ASSESSMENT — SLIT LAMP EXAM - LIDS
COMMENTS: NORMAL
COMMENTS: NORMAL

## 2022-03-08 ASSESSMENT — EXTERNAL EXAM - RIGHT EYE: OD_EXAM: NORMAL

## 2022-03-08 ASSESSMENT — CUP TO DISC RATIO
OD_RATIO: 0.3
OS_RATIO: 0.2

## 2022-03-08 ASSESSMENT — EXTERNAL EXAM - LEFT EYE: OS_EXAM: NORMAL

## 2022-03-08 NOTE — PATIENT INSTRUCTIONS
Fill glasses prescription  Allow 2 weeks to adapt to change in glasses  Have  glasses adjustment in 1 week if any concerns  Return to clinic to verify glasses and for glasses check appointment  as needed.     Return in 1 year for eye exam    Kati Petty O.D.  [facility]  [address] [place], [place] [id number] [phone]

## 2022-03-08 NOTE — LETTER
"    3/8/2022         RE: Edin Padilla  [address] [facility] [address]        Dear Colleague,    Thank you for referring your patient, Edin Padilla, to the [facility]. Please see a copy of my visit note below.    Chief Complaint   Patient presents with     COMPREHENSIVE EYE EXAM         Last Eye Exam: 1/23/2018  Dilated Previously: Yes    What are you currently using to see?  Glasses. He said that he is NOT wearing his most recent Rx, They make him dizzy. Sees well with them ,\" almost too good\". He usually wears the Rx from the year before but they are very scratched so he is currently wearing even  older glasses         Distance Vision Acuity: Satisfied with vision, glasses seem to be ok.     Near Vision Acuity: Satisfied with vision while reading and using computer with glasses    Eye Comfort: good  Do you use eye drops? 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       OBJECTIVE: See Ophthalmology exam    ASSESSMENT:    ICD-10-CM    1. Hyperopia, bilateral  H52.03    2. Regular astigmatism of both eyes  H52.223    3. Presbyopia  H52.4       PLAN:     Patient Instructions   Fill glasses prescription  Allow 2 weeks to adapt to change in glasses  Have  glasses adjustment in 1 week if any concerns  Return to clinic to verify glasses and for glasses check appointment  as needed.     Return in 1 year for eye exam    Kati Petty O.D.  [facility]  [address] [place], [place] [id number] [phone]           Again, thank you for allowing me to participate in the care of your patient.        Sincerely,        Kati Petty, OD    "

## 2022-03-08 NOTE — PROGRESS NOTES
"Chief Complaint   Patient presents with     COMPREHENSIVE EYE EXAM         Last Eye Exam: 1/23/2018  Dilated Previously: Yes    What are you currently using to see?  Glasses. He said that he is NOT wearing his most recent Rx, They make him dizzy. Sees well with them ,\" almost too good\". He usually wears the Rx from the year before but they are very scratched so he is currently wearing even  older glasses         Distance Vision Acuity: Satisfied with vision, glasses seem to be ok.     Near Vision Acuity: Satisfied with vision while reading and using computer with glasses    Eye Comfort: good  Do you use eye drops? 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       OBJECTIVE: See Ophthalmology exam    ASSESSMENT:    ICD-10-CM    1. Hyperopia, bilateral  H52.03    2. Regular astigmatism of both eyes  H52.223    3. Presbyopia  H52.4       PLAN:     Patient Instructions   Fill glasses prescription  Allow 2 weeks to adapt to change in glasses  Have  glasses adjustment in 1 week if any concerns  Return to clinic to verify glasses and for glasses check appointment  as needed.     Return in 1 year for eye exam    Kati Petty O.D.  [facility]  [address] [place], [place] [id number] [phone]       "

## 2022-03-18 ENCOUNTER — TRANSFERRED RECORDS (OUTPATIENT)
Dept: HEALTH INFORMATION MANAGEMENT | Facility: CLINIC | Age: 62
End: 2022-03-18
Payer: COMMERCIAL

## 2022-05-01 ENCOUNTER — HEALTH MAINTENANCE LETTER (OUTPATIENT)
Age: 62
End: 2022-05-01

## 2022-05-05 ENCOUNTER — OFFICE VISIT (OUTPATIENT)
Dept: FAMILY MEDICINE | Facility: CLINIC | Age: 62
End: 2022-05-05
Payer: COMMERCIAL

## 2022-05-05 VITALS
TEMPERATURE: 97.8 F | RESPIRATION RATE: 16 BRPM | BODY MASS INDEX: 28.19 KG/M2 | SYSTOLIC BLOOD PRESSURE: 138 MMHG | DIASTOLIC BLOOD PRESSURE: 86 MMHG | OXYGEN SATURATION: 100 % | HEIGHT: 71 IN | HEART RATE: 63 BPM | WEIGHT: 201.4 LBS

## 2022-05-05 DIAGNOSIS — N52.9 ERECTILE DYSFUNCTION, UNSPECIFIED ERECTILE DYSFUNCTION TYPE: ICD-10-CM

## 2022-05-05 DIAGNOSIS — R11.0 NAUSEA: ICD-10-CM

## 2022-05-05 DIAGNOSIS — Z13.6 CARDIOVASCULAR SCREENING; LDL GOAL LESS THAN 130: ICD-10-CM

## 2022-05-05 DIAGNOSIS — E04.1 THYROID NODULE: ICD-10-CM

## 2022-05-05 DIAGNOSIS — E07.9 SWELLING OF THYROID GLAND: Primary | ICD-10-CM

## 2022-05-05 DIAGNOSIS — Z12.5 SCREENING PSA (PROSTATE SPECIFIC ANTIGEN): ICD-10-CM

## 2022-05-05 DIAGNOSIS — I48.0 PAF (PAROXYSMAL ATRIAL FIBRILLATION) (H): ICD-10-CM

## 2022-05-05 LAB
CHOLEST SERPL-MCNC: 176 MG/DL
FASTING STATUS PATIENT QL REPORTED: YES
HDLC SERPL-MCNC: 73 MG/DL
LDLC SERPL CALC-MCNC: 92 MG/DL
NONHDLC SERPL-MCNC: 103 MG/DL
PSA SERPL-MCNC: 0.95 UG/L (ref 0–4)
TRIGL SERPL-MCNC: 55 MG/DL
TSH SERPL DL<=0.005 MIU/L-ACNC: 2.51 MU/L (ref 0.4–4)

## 2022-05-05 PROCEDURE — 99214 OFFICE O/P EST MOD 30 MIN: CPT | Performed by: FAMILY MEDICINE

## 2022-05-05 PROCEDURE — 80061 LIPID PANEL: CPT | Performed by: FAMILY MEDICINE

## 2022-05-05 PROCEDURE — 36415 COLL VENOUS BLD VENIPUNCTURE: CPT | Performed by: FAMILY MEDICINE

## 2022-05-05 PROCEDURE — G0103 PSA SCREENING: HCPCS | Performed by: FAMILY MEDICINE

## 2022-05-05 PROCEDURE — 84443 ASSAY THYROID STIM HORMONE: CPT | Performed by: FAMILY MEDICINE

## 2022-05-05 RX ORDER — ONDANSETRON 4 MG/1
4 TABLET, FILM COATED ORAL EVERY 8 HOURS PRN
Qty: 9 TABLET | Refills: 1 | Status: SHIPPED | OUTPATIENT
Start: 2022-05-05

## 2022-05-05 RX ORDER — SILDENAFIL CITRATE 20 MG/1
TABLET ORAL
Qty: 30 TABLET | Refills: 1 | Status: SHIPPED | OUTPATIENT
Start: 2022-05-05 | End: 2024-05-01

## 2022-05-05 ASSESSMENT — PAIN SCALES - GENERAL: PAINLEVEL: NO PAIN (0)

## 2022-05-05 NOTE — PROGRESS NOTES
"SUBJECTIVE:  Edin Padilla, a 61 year old male scheduled an appointment to discuss the following issues:  Swelling by thyroid. Would like cholesterol checked is fasting. Normal lipids in past. Patient had thyroid ultrasound 2019 = multiple nodules. Normal echo 2019 too.   Noticing a little swelling. No pain. No dysphagia. No voice changes.   Not too hot/cold. No fatigue issues. No changes in stools. No dry skin. Non-smoker. No family history cholesterol.   Parents - not mi/cva. Exercise regularly. No chest pain or shortness of breath.   History PAF x3. Triggered by emesis in past. Possible josue - no interested in cpap. Back sleeper. Some wine.   Interested trying ED meds.    Medical, social, surgical, and family histories reviewed.    ROS:  All other ROS negative    OBJECTIVE:  /86   Pulse 63   Temp 97.8  F (36.6  C) (Oral)   Resp 16   Ht 1.803 m (5' 11\")   Wt 91.4 kg (201 lb 6.4 oz)   SpO2 100%   BMI 28.09 kg/m    EXAM:  GENERAL APPEARANCE: healthy, alert and no distress  EYES: EOMI,  PERRL  HENT: ear canals and TM's normal and nose and mouth without ulcers or lesions  NECK: no adenopathy, some enlarged diffusely in thyroid. Non-tender  RESP: lungs clear to auscultation - no rales, rhonchi or wheezes  CV: regular rates and rhythm, normal S1 S2, no S3 or S4 and no murmur, click or rub -  ABDOMEN:  soft, nontender, no HSM or masses and bowel sounds normal  MS: extremities normal- no gross deformities noted, no evidence of inflammation in joints, FROM in all extremities.  SKIN: no suspicious lesions or rashes  NEURO: Normal strength and tone, sensory exam grossly normal, mentation intact and speech normal  PSYCH: mentation appears normal and affect normal/bright    ASSESSMENT / PLAN:  (E07.9) Swelling of thyroid gland  (primary encounter diagnosis)  Comment: likely nodules  Plan: TSH with free T4 reflex        Check ultrasound too. Expected course and warning signs reviewed.. consider surgery or endo " follow-up. Continue monitor    (Z13.6) CARDIOVASCULAR SCREENING; LDL GOAL LESS THAN 130  Comment: ok in past  Plan: Lipid Profile        Continue exercise    (Z12.5) Screening PSA (prostate specific antigen)  Plan: Prostate Specific Antigen Screen            (I48.0) PAF (paroxysmal atrial fibrillation) (H)  Comment: currenlty stable. Likely from mild josue and after emessi  Plan: prn zofran and limit vasalva. Sleep on sides/limit exercise and use a breath rite. Expected course and warning signs reviewed. Consider sleep study- patient not interessted. Smart watch.     (N52.9) Erectile dysfunction, unspecified erectile dysfunction type  Plan: sildenafil (REVATIO) 20 MG tablet        To ER if chest pain, shortness of breath , prolonged erections or visual changes    (R11.0) Nausea  Comment: rare in past  Plan: ondansetron (ZOFRAN) 4 MG tablet        Prn zofran to avoid a.fib. gi if worsening by rare in past.     Roc Lomeli MD

## 2022-05-09 ENCOUNTER — ANCILLARY PROCEDURE (OUTPATIENT)
Dept: ULTRASOUND IMAGING | Facility: CLINIC | Age: 62
End: 2022-05-09
Attending: FAMILY MEDICINE
Payer: COMMERCIAL

## 2022-05-09 DIAGNOSIS — E07.9 SWELLING OF THYROID GLAND: ICD-10-CM

## 2022-05-09 PROCEDURE — 76536 US EXAM OF HEAD AND NECK: CPT | Mod: TC | Performed by: RADIOLOGY

## 2022-05-11 ENCOUNTER — TELEPHONE (OUTPATIENT)
Dept: FAMILY MEDICINE | Facility: CLINIC | Age: 62
End: 2022-05-11
Payer: COMMERCIAL

## 2022-05-11 ENCOUNTER — MYC MEDICAL ADVICE (OUTPATIENT)
Dept: NURSING | Facility: CLINIC | Age: 62
End: 2022-05-11
Payer: COMMERCIAL

## 2022-05-11 NOTE — TELEPHONE ENCOUNTER
Pt notified of provider message as written.  Pt verbalized good understanding.  Greta Staley BSN, RN

## 2022-05-11 NOTE — TELEPHONE ENCOUNTER
Pt notified of provider message as written.  Scheduling information sent to pt through [employer].    Pt said he had an ultrasound of the thyroid done 2-3 years ago.  He is wondering if the two were or could be compared.  Is there an increase in size over the 2-3 years.  If no change is a biopsy necessary?  Greta Staley BSN, RN

## 2022-05-11 NOTE — TELEPHONE ENCOUNTER
Roc Lomeli MD  [facility]  Please call patient. One thyroid nodule is borderline too big - chances are it's still benign but it is recommended to get a thyroid biopsy. Referral placed for [facility] [place] to perform procedure - done in office. Please give number to set-up.     Patient:   Edin Padilla   [phone] (home) [phone] (work)     Provider:   Roc Lomeli MD MD   Please call/evisit with questions or concerns.

## 2022-05-23 ENCOUNTER — TRANSFERRED RECORDS (OUTPATIENT)
Dept: HEALTH INFORMATION MANAGEMENT | Facility: CLINIC | Age: 62
End: 2022-05-23
Payer: COMMERCIAL

## 2022-05-31 ENCOUNTER — MYC MEDICAL ADVICE (OUTPATIENT)
Dept: FAMILY MEDICINE | Facility: CLINIC | Age: 62
End: 2022-05-31
Payer: COMMERCIAL

## 2022-06-02 NOTE — TELEPHONE ENCOUNTER
Pt called back to get results on his thyroid test.      Is looking to get a call back as soon as possible.    Dorita Madison RN  [facility]

## 2022-11-21 ENCOUNTER — HEALTH MAINTENANCE LETTER (OUTPATIENT)
Age: 62
End: 2022-11-21

## 2022-12-27 ENCOUNTER — MYC MEDICAL ADVICE (OUTPATIENT)
Dept: FAMILY MEDICINE | Facility: CLINIC | Age: 62
End: 2022-12-27

## 2022-12-27 DIAGNOSIS — M76.60 ACHILLES TENDON PAIN: Primary | ICD-10-CM

## 2023-01-03 ENCOUNTER — ANCILLARY PROCEDURE (OUTPATIENT)
Dept: GENERAL RADIOLOGY | Facility: CLINIC | Age: 63
End: 2023-01-03
Attending: PODIATRIST
Payer: COMMERCIAL

## 2023-01-03 ENCOUNTER — OFFICE VISIT (OUTPATIENT)
Dept: PODIATRY | Facility: CLINIC | Age: 63
End: 2023-01-03
Attending: FAMILY MEDICINE
Payer: COMMERCIAL

## 2023-01-03 VITALS — DIASTOLIC BLOOD PRESSURE: 78 MMHG | HEART RATE: 70 BPM | SYSTOLIC BLOOD PRESSURE: 137 MMHG

## 2023-01-03 DIAGNOSIS — M76.60 ACHILLES TENDON PAIN: ICD-10-CM

## 2023-01-03 PROBLEM — E87.6 HYPOKALEMIA: Status: ACTIVE | Noted: 2019-04-23

## 2023-01-03 PROBLEM — H81.10 BENIGN POSITIONAL VERTIGO: Status: ACTIVE | Noted: 2019-04-23

## 2023-01-03 PROBLEM — K64.9 HEMORRHOIDS: Status: ACTIVE | Noted: 2022-03-18

## 2023-01-03 PROBLEM — E04.2 NON-TOXIC MULTINODULAR GOITER: Status: ACTIVE | Noted: 2019-07-10

## 2023-01-03 PROCEDURE — 99203 OFFICE O/P NEW LOW 30 MIN: CPT | Performed by: PODIATRIST

## 2023-01-03 PROCEDURE — 73630 X-RAY EXAM OF FOOT: CPT | Mod: TC | Performed by: RADIOLOGY

## 2023-01-03 NOTE — PATIENT INSTRUCTIONS
We wish you continued good healing. If you have any questions or concerns, please do not hesitate to contact us at  [phone]    [facility] [employer] (secure e-mail communication and access to your chart) to send a message or to make an appointment.    Please remember to call and schedule a follow up appointment if one was recommended at your earliest convenience.     PODIATRY CLINIC HOURS  TELEPHONE NUMBER    Dr. Db KEITHPBALDO [facility]        Clinics:  [facility]   [place]  Tuesday 1PM-6PM  [place]  Wednesday 745AM-330PM  [place]  Thursday/Friday 745AM-230PM       [place] APPOINTMENTS  [phone]    [place] APPOINTMENTS  [phone]        If you need a medication refill, please contact us you may need lab work and/or a follow up visit prior to your refill (i.e. Antifungal medications).  If MRI needed please call Imaging at [phone]   HOW DO I GET MY KNEE SCOOTER? Knee scooters can be picked up at ANY Medical Supply stores with your knee scooter Prescription.  OR  Bring your signed prescription to an [facility] Medical Equipment showroom.

## 2023-01-03 NOTE — PROGRESS NOTES
Pt is seen today as a new pt referral with the c/c of painful left foot.  This has been symptomatic for the past 11 weeks.  Pt denies any hx of trauma.  Aggravated by activity and relieved by rest.  Describes as burning pain.  Is slowly getting worse.   Points to posterior central heel at Achilles insertion.  States this is starting to get better.  He is an .  Times working out of his house.  Sometimes at work and is on his feet.  Denies change in activities.  Denies erythema ecchymosis.    ROS:  See above         Allergies   Allergen Reactions     Other [Seasonal Allergies]      Hayfever. Eye irritation. Nasal discharge.       Current Outpatient Medications   Medication Sig Dispense Refill     albuterol (PROAIR HFA/PROVENTIL HFA/VENTOLIN HFA) 108 (90 Base) MCG/ACT inhaler Inhale 2 puffs into the lungs every 6 hours (Patient not taking: Reported on 5/5/2022) 1 Inhaler 1     ondansetron (ZOFRAN) 4 MG tablet Take 1 tablet (4 mg) by mouth every 8 hours as needed for nausea 9 tablet 1     sildenafil (REVATIO) 20 MG tablet 1-3 tabs daily as needed for ed 30 tablet 1       Patient Active Problem List   Diagnosis     CARDIOVASCULAR SCREENING; LDL GOAL LESS THAN 160     GERD (gastroesophageal reflux disease)     PAF (paroxysmal atrial fibrillation) (H)     Esophageal reflux     Advanced directives, counseling/discussion       Past Medical History:   Diagnosis Date     A-fib (H) 2008     History of cardioversion 2008, 2012     Vertigo        Past Surgical History:   Procedure Laterality Date     COLONOSCOPY       ESOPHAGOSCOPY, GASTROSCOPY, DUODENOSCOPY (EGD), COMBINED  1/7/2014    Procedure: COMBINED ESOPHAGOSCOPY, GASTROSCOPY, DUODENOSCOPY (EGD), BIOPSY SINGLE OR MULTIPLE;  EGD-UPPER GI SYPTOMS / LEISA;  Surgeon: Mukund Au MD;  Location: [facility] OR     TONSILLECTOMY & ADENOIDECTOMY         Family History   Problem Relation Age of Onset     Cancer Mother         lymphoma     Thyroid Disease Mother       Hypertension Father      Cerebrovascular Disease Paternal Grandmother      Diabetes No family hx of      Glaucoma No family hx of      Macular Degeneration No family hx of        Social History     Tobacco Use     Smoking status: Never     Smokeless tobacco: Never     Tobacco comments:     no 2nd hand smoke exposure   Substance Use Topics     Alcohol use: Yes     Alcohol/week: 0.0 standard drinks     Comment: 1/2 bottle of wine on Friday and Saturday evenings         Exam:    Vitals: /78   Pulse 70   BMI: There is no height or weight on file to calculate BMI.  Height: Data Unavailable    Constitutional/ general:  Pt is in no apparent distress, appears well-nourished.  Cooperative with history and physical exam.     Psych:  The patient answered questions appropriately.  Normal affect.  Seems to have reasonable expectations, in terms of treatment.     Lungs:  Non labored breathing, non labored speech. No cough.  No audible wheezing. Even, quiet breathing.       Vascular:  positive pedal pulses bilaterally for both the DP and PT arteries.  CFT < 3 sec.  negative ankle edema.  positive pedal hair growth.    Neuro:  Alert and oriented x 3. Coordinated gait.  Light touch sensation is intact     Derm: Normal texture and turgor.  No erythema, ecchymosis, or cyanosis.      Musculoskeletal:    Patient is ambulatory without an assistive device or brace.   Cavus foot with weightbearing bilateral.  No forefoot or rear foot deformities noted.  MS 5/5 all compartments.  Normal ROM all fore foot and rearfoot joints with no pain or crepitus.  No equinus.  Pain on posterior central calcaneus left Achilles insertion.  No pain medial or lateral.  No bursa pain.  Slight edema.  No masses.  No ecchymosis.      Radiographic Exam:  Xrays show saddle at Achilles insertion, otherwise unremarkable    A:  Insertional Achilles Tendonitis left foot     Plan:  X-rays taken left foot today.   Discussed etiology and treatment options in  detail w/ the pt.  Dispense heel lift.  Patient to wear good shoes at all times both inside and outside and I made suggestions.  Discussed massage.  Discussed ice.  We use diclofenac gel on this to reduce edema.  Avoid activities that bother this until feeling better.  Also discussed over-the-counter arch supports.  Discussed orthotics as well.  Placed order for these and patient may get a pair if he desires..  Discussed physical therapy.  Patient will call if he would like to do this.  RTC as needed    Db Moody DPM, FACFAS

## 2023-01-03 NOTE — LETTER
1/3/2023         RE: Edin Padilla  [id number] [address] [id number]        Dear Colleague,    Thank you for referring your patient, Edin Padilla, to the [facility]. Please see a copy of my visit note below.      Pt is seen today as a new pt referral with the c/c of painful left foot.  This has been symptomatic for the past 11 weeks.  Pt denies any hx of trauma.  Aggravated by activity and relieved by rest.  Describes as burning pain.  Is slowly getting worse.   Points to posterior central heel at Achilles insertion.  States this is starting to get better.  He is an .  Times working out of his house.  Sometimes at work and is on his feet.  Denies change in activities.  Denies erythema ecchymosis.    ROS:  See above         Allergies   Allergen Reactions     Other [Seasonal Allergies]      Hayfever. Eye irritation. Nasal discharge.       Current Outpatient Medications   Medication Sig Dispense Refill     albuterol (PROAIR HFA/PROVENTIL HFA/VENTOLIN HFA) 108 (90 Base) MCG/ACT inhaler Inhale 2 puffs into the lungs every 6 hours (Patient not taking: Reported on 5/5/2022) 1 Inhaler 1     ondansetron (ZOFRAN) 4 MG tablet Take 1 tablet (4 mg) by mouth every 8 hours as needed for nausea 9 tablet 1     sildenafil (REVATIO) 20 MG tablet 1-3 tabs daily as needed for ed 30 tablet 1       Patient Active Problem List   Diagnosis     CARDIOVASCULAR SCREENING; LDL GOAL LESS THAN 160     GERD (gastroesophageal reflux disease)     PAF (paroxysmal atrial fibrillation) (H)     Esophageal reflux     Advanced directives, counseling/discussion       Past Medical History:   Diagnosis Date     A-fib (H) 2008     History of cardioversion 2008, 2012     Vertigo        Past Surgical History:   Procedure Laterality Date     COLONOSCOPY       ESOPHAGOSCOPY, GASTROSCOPY, DUODENOSCOPY (EGD), COMBINED  1/7/2014    Procedure: COMBINED ESOPHAGOSCOPY, GASTROSCOPY, DUODENOSCOPY (EGD), BIOPSY SINGLE  OR MULTIPLE;  EGD-UPPER GI SYPTOMS / LEISA;  Surgeon: Mukund Au MD;  Location: [facility] OR     TONSILLECTOMY & ADENOIDECTOMY         Family History   Problem Relation Age of Onset     Cancer Mother         lymphoma     Thyroid Disease Mother      Hypertension Father      Cerebrovascular Disease Paternal Grandmother      Diabetes No family hx of      Glaucoma No family hx of      Macular Degeneration No family hx of        Social History     Tobacco Use     Smoking status: Never     Smokeless tobacco: Never     Tobacco comments:     no 2nd hand smoke exposure   Substance Use Topics     Alcohol use: Yes     Alcohol/week: 0.0 standard drinks     Comment: 1/2 bottle of wine on Friday and Saturday evenings         Exam:    Vitals: /78   Pulse 70   BMI: There is no height or weight on file to calculate BMI.  Height: Data Unavailable    Constitutional/ general:  Pt is in no apparent distress, appears well-nourished.  Cooperative with history and physical exam.     Psych:  The patient answered questions appropriately.  Normal affect.  Seems to have reasonable expectations, in terms of treatment.     Lungs:  Non labored breathing, non labored speech. No cough.  No audible wheezing. Even, quiet breathing.       Vascular:  positive pedal pulses bilaterally for both the DP and PT arteries.  CFT < 3 sec.  negative ankle edema.  positive pedal hair growth.    Neuro:  Alert and oriented x 3. Coordinated gait.  Light touch sensation is intact     Derm: Normal texture and turgor.  No erythema, ecchymosis, or cyanosis.      Musculoskeletal:    Patient is ambulatory without an assistive device or brace.   Cavus foot with weightbearing bilateral.  No forefoot or rear foot deformities noted.  MS 5/5 all compartments.  Normal ROM all fore foot and rearfoot joints with no pain or crepitus.  No equinus.  Pain on posterior central calcaneus left Achilles insertion.  No pain medial or lateral.  No bursa pain.  Slight edema.  No  masses.  No ecchymosis.      Radiographic Exam:  Xrays show saddle at Achilles insertion, otherwise unremarkable    A:  Insertional Achilles Tendonitis left foot     Plan:  X-rays taken left foot today.   Discussed etiology and treatment options in detail w/ the pt.  Dispense heel lift.  Patient to wear good shoes at all times both inside and outside and I made suggestions.  Discussed massage.  Discussed ice.  We use diclofenac gel on this to reduce edema.  Avoid activities that bother this until feeling better.  Also discussed over-the-counter arch supports.  Discussed orthotics as well.  Placed order for these and patient may get a pair if he desires..  Discussed physical therapy.  Patient will call if he would like to do this.  RTC as needed    Db Moody DPM, FACFAS             Again, thank you for allowing me to participate in the care of your patient.        Sincerely,        Db Moody DPM

## 2023-02-08 ENCOUNTER — E-VISIT (OUTPATIENT)
Dept: URGENT CARE | Facility: CLINIC | Age: 63
End: 2023-02-08
Payer: COMMERCIAL

## 2023-02-08 DIAGNOSIS — J01.90 ACUTE BACTERIAL SINUSITIS: Primary | ICD-10-CM

## 2023-02-08 DIAGNOSIS — B96.89 ACUTE BACTERIAL SINUSITIS: Primary | ICD-10-CM

## 2023-02-08 PROCEDURE — 99421 OL DIG E/M SVC 5-10 MIN: CPT | Performed by: EMERGENCY MEDICINE

## 2023-02-08 NOTE — PATIENT INSTRUCTIONS
Sinusitis (Antibiotic Treatment)    The sinuses are air-filled spaces within the bones of the face. They connect to the inside of the nose. Sinusitis is an inflammation of the tissue that lines the sinuses. Sinusitis can occur during a cold. It can also happen due to allergies to pollens and other particles in the air. Sinusitis can cause symptoms of sinus congestion and a feeling of fullness. A sinus infection causes fever, headache, and facial pain. There is often green or yellow fluid draining from the nose or into the back of the throat (post-nasal drip). You have been given antibiotics to treat this condition.   Home care    Take the full course of antibiotics as instructed. Don't stop taking them, even when you feel better.    Drink plenty of water, hot tea, and other liquids as directed by the healthcare provider. This may help thin nasal mucus. It also may help your sinuses drain fluids.    Heat may help soothe painful areas of your face. Use a towel soaked in hot water. Or,  the shower and direct the warm spray onto your face. Using a vaporizer along with a menthol rub at night may also help soothe symptoms.     An expectorant with guaifenesin may help thin nasal mucus and help your sinuses drain fluids. Talk with your provider or pharmacists before taking an over-the-counter (OTC) medicine if you have any questions about it or its side effects..    You can use an OTC decongestant, unless a similar medicine was prescribed to you. Nasal sprays work the fastest. Use one that contains phenylephrine or oxymetazoline. First blow your nose gently. Then use the spray. Don't use these medicines more often than directed on the label. If you do, your symptoms may get worse. You may also take pills that contain pseudoephedrine. Don t use products that combine multiple medicines. This is because side effects may be increased. Read labels. You can also ask the pharmacist for help. (People with high blood  pressure should not use decongestants. They can raise blood pressure.) Talk with your provider or pharmacist if you have any questions about the medicine..    OTC antihistamines may help if allergies contributed to your sinusitis. Talk with your provider or pharmacist if you have any questions about the medicine..    Don't use nasal rinses or irrigation during an acute sinus infection, unless your healthcare provider tells you to. Rinsing may spread the infection to other areas in your sinuses.    Use acetaminophen or ibuprofen to control pain, unless another pain medicine was prescribed to you. If you have chronic liver or kidney disease or ever had a stomach ulcer, talk with your healthcare provider before using these medicines. Never give aspirin to anyone under age 18 who is ill with a fever. It may cause severe liver damage.    Don't smoke. This can make symptoms worse.    Follow-up care  Follow up with your healthcare provider, or as advised.   When to seek medical advice  Call your healthcare provider if any of these occur:     Facial pain or headache that gets worse    Stiff neck    Unusual drowsiness or confusion    Swelling of your forehead or eyelids    Symptoms don't go away in 10 days    Vision problems, such as blurred or double vision    Fever of 100.4 F (38 C) or higher, or as directed by your healthcare provider  Call 911  Call 911 if any of these occur:     Seizure    Trouble breathing    Feeling dizzy or faint    Fingernails, skin or lips look blue, purple , or gray  Prevention  Here are steps you can take to help prevent an infection:     Keep good hand washing habits.    Don t have close contact with people who have sore throats, colds, or other upper respiratory infections.    Don t smoke, and stay away from secondhand smoke.    Stay up to date with of your vaccines.  [employer] last reviewed this educational content on 12/1/2019 2000-2021 [employer]. All rights reserved. This  information is not intended as a substitute for professional medical care. Always follow your healthcare professional's instructions.        Dear Edin Padilla      Based on your responses and diagnosis, I have prescribed Augmentin  to treat your symptoms. I have sent this to your pharmacy.?     It is also important to stay well hydrated, get lots of rest and take over-the-counter decongestants,?tylenol?or ibuprofen if you?are able to?take those medications per your primary care provider to help relieve discomfort.?     It is important that you take?all of?your prescribed medication even if your symptoms are improving after a few doses.? Taking?all of?your medicine helps prevent the symptoms from returning.?     If your symptoms worsen, you develop severe headache, vomiting, high fever (>102), or are not improving in 7 days, please contact your primary care provider for an appointment or visit any of our convenient Walk-in Care or Urgent Care Centers to be seen which can be found on our website?here.?     Thanks again for choosing?us?as your health care partner,?   ?  Mart Granado MD?

## 2023-02-20 ENCOUNTER — E-VISIT (OUTPATIENT)
Dept: URGENT CARE | Facility: URGENT CARE | Age: 63
End: 2023-02-20
Payer: COMMERCIAL

## 2023-02-20 DIAGNOSIS — B96.89 ACUTE BACTERIAL SINUSITIS: ICD-10-CM

## 2023-02-20 DIAGNOSIS — J01.11 ACUTE RECURRENT FRONTAL SINUSITIS: Primary | ICD-10-CM

## 2023-02-20 DIAGNOSIS — J01.90 ACUTE BACTERIAL SINUSITIS: ICD-10-CM

## 2023-02-20 PROCEDURE — 99421 OL DIG E/M SVC 5-10 MIN: CPT | Performed by: NURSE PRACTITIONER

## 2023-02-20 RX ORDER — PREDNISONE 20 MG/1
40 TABLET ORAL DAILY
Qty: 10 TABLET | Refills: 0 | Status: SHIPPED | OUTPATIENT
Start: 2023-02-20 | End: 2023-02-25

## 2023-02-20 RX ORDER — DOXYCYCLINE HYCLATE 100 MG
100 TABLET ORAL 2 TIMES DAILY
Qty: 20 TABLET | Refills: 0 | Status: SHIPPED | OUTPATIENT
Start: 2023-02-20 | End: 2023-03-02

## 2023-02-20 NOTE — PATIENT INSTRUCTIONS
You may want to try a nasal lavage (also known as nasal irrigation). You can find over-the-counter products, such as Neti-Pot, at retail locations or make your own at home. Instructions for homemade nasal lavage and more information on the process are available online at http://www.aafp.org/afp/2009/1115/p1121.html.    Dear Edin Padilla    After reviewing your responses, I've been able to diagnose you with    Acute recurrent frontal sinusitis  Acute bacterial sinusitis.      Based on your responses and diagnosis, I have prescribed   Orders Placed This Encounter     predniSONE (DELTASONE) 20 MG tablet and Doxycyline    to treat your symptoms. I have sent this to your pharmacy.?     The doxycyline is the antibiotics, but I have also added the Prednisone to further decrease the swelling and inflammation.      It is also important to stay well hydrated, get lots of rest and take over-the-counter decongestants,?tylenol?or ibuprofen if you?are able to?take those medications per your primary care provider to help relieve discomfort.?     It is important that you take?all of?your prescribed medication even if your symptoms are improving after a few doses.? Taking?all of?your medicine helps prevent the symptoms from returning.?     If your symptoms worsen, you develop severe headache, vomiting, high fever (>102), or are not improving in 7 days, please contact your primary care provider for an appointment or visit any of our convenient Walk-in Care or Urgent Care Centers to be seen which can be found on our website?here.?     Thanks again for choosing?us?as your health care partner,?   ?  Monica Francis CNP?

## 2023-06-02 ENCOUNTER — HEALTH MAINTENANCE LETTER (OUTPATIENT)
Age: 63
End: 2023-06-02

## 2023-11-28 ENCOUNTER — TELEPHONE (OUTPATIENT)
Dept: FAMILY MEDICINE | Facility: CLINIC | Age: 63
End: 2023-11-28
Payer: COMMERCIAL

## 2023-11-28 NOTE — TELEPHONE ENCOUNTER
Patient Quality Outreach    Patient is due for the following:   Physical Preventive Adult Physical      Topic Date Due    Flu Vaccine (1) 09/01/2023    COVID-19 Vaccine (6 - 2023-24 season) 09/01/2023       Next Steps:   Schedule a Adult Preventative    Type of outreach:    Sent [employer] message.      Questions for provider review:    None           Xochitl Harrington, CMA

## 2023-12-08 ENCOUNTER — OFFICE VISIT (OUTPATIENT)
Dept: OPTOMETRY | Facility: CLINIC | Age: 63
End: 2023-12-08
Payer: COMMERCIAL

## 2023-12-08 DIAGNOSIS — H10.13 ALLERGIC CONJUNCTIVITIS, BILATERAL: Primary | ICD-10-CM

## 2023-12-08 PROCEDURE — 99214 OFFICE O/P EST MOD 30 MIN: CPT | Performed by: OPTOMETRIST

## 2023-12-08 RX ORDER — NEOMYCIN SULFATE, POLYMYXIN B SULFATE AND DEXAMETHASONE 3.5; 10000; 1 MG/ML; [USP'U]/ML; MG/ML
1-2 SUSPENSION/ DROPS OPHTHALMIC EVERY 4 HOURS
Qty: 12.6 ML | Refills: 0 | Status: SHIPPED | OUTPATIENT
Start: 2023-12-08 | End: 2023-12-29

## 2023-12-08 ASSESSMENT — TONOMETRY
OD_IOP_MMHG: 16
IOP_METHOD: APPLANATION
OS_IOP_MMHG: 16

## 2023-12-08 ASSESSMENT — EXTERNAL EXAM - RIGHT EYE: OD_EXAM: NORMAL

## 2023-12-08 ASSESSMENT — SLIT LAMP EXAM - LIDS
COMMENTS: NORMAL
COMMENTS: NORMAL

## 2023-12-08 ASSESSMENT — VISUAL ACUITY
CORRECTION_TYPE: GLASSES
OS_CC: 20/20
OD_CC: 20/25
METHOD: SNELLEN - LINEAR
OD_CC+: -3

## 2023-12-08 ASSESSMENT — EXTERNAL EXAM - LEFT EYE: OS_EXAM: NORMAL

## 2023-12-08 NOTE — PROGRESS NOTES
Chief Complaint   Patient presents with    Eye Problem Both Eyes     Patient states that in late September he was on vacation and started to feel like there was something in his left eye. He went to a doctor out of town and was given valacyclovir to take and told that it was possibly herpes. It did get better for a bit but seem to be getting worse again.   Patient denies pain, states it's more bothersome and looks terrible. Eyes are red and burning with dryness and itching        Chief Complaint(s) and History of Present Illness(es)       Eye Problem Both Eyes              Laterality: both eyes    Onset: unknown    Onset: 3 months ago    Associated symptoms: dryness, redness, itching and burning    Treatments tried: eye drops    Response to treatment: no improvement    Pain scale: 0/10    Comments: Patient states that in late September he was on vacation and started to feel like there was something in his left eye. He went to a doctor out of town and was given valacyclovir to take and told that it was possibly herpes. It did get better for a bit but seem to be getting worse again.   Patient denies pain, states it's more bothersome and looks terrible. Eyes are red and burning with dryness and itching                         Krystle Coburn Optometric Assistant      Review of Symptoms            Medical, surgical and family histories reviewed and updated 12/8/2023.         OBJECTIVE: See Ophthalmology exam    ASSESSMENT:    ICD-10-CM    1. Allergic conjunctivitis, bilateral - Both Eyes  H10.13 neomycin-polymyxin-dexAMETHasone (MAXITROL) [id number] SUSP ophthalmic susp         PLAN:    Patient Instructions   Maxitrol has been prescribed for the eye concerns today.    RTC for followup    Schedule a eye examination at [facility] so an OCT can be done to manage the nevus in the left eye.      Adrienne Esteban O.D.  [facility]  [address] [place], [place] [id number] [phone]

## 2023-12-08 NOTE — PATIENT INSTRUCTIONS
Maxitrol has been prescribed for the eye concerns today.    RTC for followup    Schedule a eye examination at [facility] so an OCT can be done to manage the nevus in the left eye.      Adrienne Esteban O.D.  [facility]  [address] [place] [address] [phone]

## 2023-12-08 NOTE — LETTER
12/8/2023         RE: Edin Padilla  [address] [facility] [address]        Dear Colleague,    Thank you for referring your patient, Edin Padilla, to the [facility]. Please see a copy of my visit note below.    Chief Complaint   Patient presents with     Eye Problem Both Eyes     Patient states that in late September he was on vacation and started to feel like there was something in his left eye. He went to a doctor out of town and was given valacyclovir to take and told that it was possibly herpes. It did get better for a bit but seem to be getting worse again.   Patient denies pain, states it's more bothersome and looks terrible. Eyes are red and burning with dryness and itching        Chief Complaint(s) and History of Present Illness(es)       Eye Problem Both Eyes              Laterality: both eyes    Onset: unknown    Onset: 3 months ago    Associated symptoms: dryness, redness, itching and burning    Treatments tried: eye drops    Response to treatment: no improvement    Pain scale: 0/10    Comments: Patient states that in late September he was on vacation and started to feel like there was something in his left eye. He went to a doctor out of town and was given valacyclovir to take and told that it was possibly herpes. It did get better for a bit but seem to be getting worse again.   Patient denies pain, states it's more bothersome and looks terrible. Eyes are red and burning with dryness and itching                         Krystle Coburn Optometric Assistant      Review of Symptoms    {ROS :103740}        Medical, surgical and family histories reviewed and updated 12/8/2023.         OBJECTIVE: See Ophthalmology exam    ASSESSMENT:  No diagnosis found.   PLAN:    There are no Patient Instructions on file for this visit.          Again, thank you for allowing me to participate in the care of your patient.        Sincerely,        Adrienne Esteban, OD

## 2024-02-20 ENCOUNTER — OFFICE VISIT (OUTPATIENT)
Dept: URGENT CARE | Facility: URGENT CARE | Age: 64
End: 2024-02-20
Payer: COMMERCIAL

## 2024-02-20 VITALS
HEART RATE: 66 BPM | RESPIRATION RATE: 10 BRPM | DIASTOLIC BLOOD PRESSURE: 80 MMHG | OXYGEN SATURATION: 97 % | TEMPERATURE: 97.7 F | SYSTOLIC BLOOD PRESSURE: 145 MMHG

## 2024-02-20 DIAGNOSIS — J01.90 ACUTE SINUSITIS WITH SYMPTOMS > 10 DAYS: Primary | ICD-10-CM

## 2024-02-20 PROCEDURE — 99213 OFFICE O/P EST LOW 20 MIN: CPT | Performed by: INTERNAL MEDICINE

## 2024-02-20 RX ORDER — DOXYCYCLINE 100 MG/1
100 CAPSULE ORAL 2 TIMES DAILY
Qty: 28 CAPSULE | Refills: 0 | Status: SHIPPED | OUTPATIENT
Start: 2024-02-20 | End: 2024-03-05

## 2024-02-20 NOTE — PROGRESS NOTES
SUBJECTIVE:  Edin Padilla is an 63 year old male who presents for sinus concern.  Started 6 weeks ago while was in [place].  Was put on amoxicillin/clav there but didn't help.  Will seem like is starting to improve, but then sxs worsen again.  Has some eye irritation, ear fullness.  Frontal headaches and sinus fullness.  Some sore throat.  Has had a lot of runny nose, but now less running but still a lot of congestion.  Initially had fevers.  No n/v/d.      PMH:   has a past medical history of A-fib (H) (2008), History of cardioversion (2008, 2012), and Vertigo.  Patient Active Problem List   Diagnosis    CARDIOVASCULAR SCREENING; LDL GOAL LESS THAN 160    GERD (gastroesophageal reflux disease)    PAF (paroxysmal atrial fibrillation) (H)    Esophageal reflux    Advanced directives, counseling/discussion    Non-toxic multinodular goiter    Hypokalemia    Hemorrhoids    Benign positional vertigo     Social History     Socioeconomic History    Marital status:      Spouse name: None    Number of children: None    Years of education: None    Highest education level: None   Tobacco Use    Smoking status: Never    Smokeless tobacco: Never    Tobacco comments:     no 2nd hand smoke exposure   Substance and Sexual Activity    Alcohol use: Yes     Alcohol/week: 0.0 standard drinks of alcohol     Comment: 1/2 bottle of wine on Friday and Saturday evenings    Drug use: No    Sexual activity: Yes     Partners: Female   Other Topics Concern     Service No    Blood Transfusions No    Caffeine Concern No    Occupational Exposure No    Hobby Hazards No    Sleep Concern No    Stress Concern No    Weight Concern No    Special Diet No    Back Care No    Exercise No    Bike Helmet No    Seat Belt No    Self-Exams No     Family History   Problem Relation Age of Onset    Cancer Mother         lymphoma    Thyroid Disease Mother     Hypertension Father     Cerebrovascular Disease Paternal Grandmother     Diabetes No  family hx of     Glaucoma No family hx of     Macular Degeneration No family hx of        ALLERGIES:  Other [seasonal allergies]    Current Outpatient Medications   Medication    ondansetron (ZOFRAN) 4 MG tablet    sildenafil (REVATIO) 20 MG tablet    albuterol (PROAIR HFA/PROVENTIL HFA/VENTOLIN HFA) 108 (90 Base) MCG/ACT inhaler     No current facility-administered medications for this visit.         ROS:  ROS is done and is negative for general/constitutional, eye, ENT, Respiratory, cardiovascular, GI, , Skin, musculoskeletal except as noted elsewhere.  All other review of systems negative except as noted elsewhere.      OBJECTIVE:  BP (!) 145/80   Pulse 66   Temp 97.7  F (36.5  C) (Oral)   Resp 10   SpO2 97%   GENERAL APPEARANCE: Alert, in no acute distress  EYES: normal  EARS: External ears normal. Canals clear. TMs with mild clear effusions, no erythema  NOSE:moderately inflamed mucosa  OROPHARYNX:normal  SINUSES: frontal tenderness  NECK:No adenopathy,masses or thyromegaly  RESP: normal and clear to auscultation  CV:regular rate and rhythm and no murmurs, clicks, or gallops  ABDOMEN: Abdomen soft, non-tender. BS normal. No masses, organomegaly  SKIN: no ulcers, lesions or rash  MUSCULOSKELETAL:Musculoskeletal normal    RESULTS  No results found for any visits on 02/20/24..  No results found for this or any previous visit (from the past 48 hour(s)).    ASSESSMENT/PLAN:    ASSESSMENT / PLAN:  (J01.90) Acute sinusitis with symptoms > 10 days  (primary encounter diagnosis)  Comment: did not resolve fully with augmentin six weeks ago, so will choose different abx and put on for 14 days  Plan: doxycycline hyclate (VIBRAMYCIN) 100 MG capsule        Reviewed medication instructions and side effects. Follow up if experiences side effects.. I reviewed supportive care, otc meds to use if needed, expected course, and signs of concern.  Follow up as needed or if does not improve within 1 week(s) or if worsens in any  way.  Reviewed red flag symptoms and is to go to the ER if experiences any of these.  If sxs not resolve or if recur again quickly, advised to see ENT for further evaluation.    See [facility] for orders, medications, letters, patient instructions    Sofia Lindsey M.D.

## 2024-02-20 NOTE — PATIENT INSTRUCTIONS
If you do not improve, or if it recurs again quickly, consider seeing an ENT doctor for further evaluation.

## 2024-04-30 SDOH — HEALTH STABILITY: PHYSICAL HEALTH: ON AVERAGE, HOW MANY DAYS PER WEEK DO YOU ENGAGE IN MODERATE TO STRENUOUS EXERCISE (LIKE A BRISK WALK)?: 4 DAYS

## 2024-04-30 SDOH — HEALTH STABILITY: PHYSICAL HEALTH: ON AVERAGE, HOW MANY MINUTES DO YOU ENGAGE IN EXERCISE AT THIS LEVEL?: 40 MIN

## 2024-04-30 ASSESSMENT — SOCIAL DETERMINANTS OF HEALTH (SDOH): HOW OFTEN DO YOU GET TOGETHER WITH FRIENDS OR RELATIVES?: ONCE A WEEK

## 2024-05-01 ENCOUNTER — OFFICE VISIT (OUTPATIENT)
Dept: FAMILY MEDICINE | Facility: CLINIC | Age: 64
End: 2024-05-01
Payer: COMMERCIAL

## 2024-05-01 VITALS
HEIGHT: 70 IN | WEIGHT: 201 LBS | SYSTOLIC BLOOD PRESSURE: 133 MMHG | HEART RATE: 71 BPM | BODY MASS INDEX: 28.77 KG/M2 | DIASTOLIC BLOOD PRESSURE: 81 MMHG | RESPIRATION RATE: 16 BRPM | TEMPERATURE: 97.5 F | OXYGEN SATURATION: 97 %

## 2024-05-01 DIAGNOSIS — N52.9 ERECTILE DYSFUNCTION, UNSPECIFIED ERECTILE DYSFUNCTION TYPE: ICD-10-CM

## 2024-05-01 DIAGNOSIS — R10.13 ABDOMINAL PAIN, EPIGASTRIC: ICD-10-CM

## 2024-05-01 DIAGNOSIS — Z00.00 ROUTINE GENERAL MEDICAL EXAMINATION AT A HEALTH CARE FACILITY: Primary | ICD-10-CM

## 2024-05-01 DIAGNOSIS — Z12.5 SCREENING FOR PROSTATE CANCER: ICD-10-CM

## 2024-05-01 LAB
BASOPHILS # BLD AUTO: 0.1 10E3/UL (ref 0–0.2)
BASOPHILS NFR BLD AUTO: 1 %
EOSINOPHIL # BLD AUTO: 0.2 10E3/UL (ref 0–0.7)
EOSINOPHIL NFR BLD AUTO: 3 %
ERYTHROCYTE [DISTWIDTH] IN BLOOD BY AUTOMATED COUNT: 12 % (ref 10–15)
HCT VFR BLD AUTO: 44.5 % (ref 40–53)
HGB BLD-MCNC: 15.2 G/DL (ref 13.3–17.7)
IMM GRANULOCYTES # BLD: 0 10E3/UL
IMM GRANULOCYTES NFR BLD: 0 %
LYMPHOCYTES # BLD AUTO: 2 10E3/UL (ref 0.8–5.3)
LYMPHOCYTES NFR BLD AUTO: 34 %
MCH RBC QN AUTO: 29 PG (ref 26.5–33)
MCHC RBC AUTO-ENTMCNC: 34.2 G/DL (ref 31.5–36.5)
MCV RBC AUTO: 85 FL (ref 78–100)
MONOCYTES # BLD AUTO: 0.5 10E3/UL (ref 0–1.3)
MONOCYTES NFR BLD AUTO: 8 %
NEUTROPHILS # BLD AUTO: 3.3 10E3/UL (ref 1.6–8.3)
NEUTROPHILS NFR BLD AUTO: 55 %
PLATELET # BLD AUTO: 224 10E3/UL (ref 150–450)
RBC # BLD AUTO: 5.24 10E6/UL (ref 4.4–5.9)
WBC # BLD AUTO: 6 10E3/UL (ref 4–11)

## 2024-05-01 PROCEDURE — 80061 LIPID PANEL: CPT | Performed by: FAMILY MEDICINE

## 2024-05-01 PROCEDURE — 82247 BILIRUBIN TOTAL: CPT | Performed by: FAMILY MEDICINE

## 2024-05-01 PROCEDURE — 82310 ASSAY OF CALCIUM: CPT | Performed by: FAMILY MEDICINE

## 2024-05-01 PROCEDURE — 84155 ASSAY OF PROTEIN SERUM: CPT | Performed by: FAMILY MEDICINE

## 2024-05-01 PROCEDURE — 84075 ASSAY ALKALINE PHOSPHATASE: CPT | Performed by: FAMILY MEDICINE

## 2024-05-01 PROCEDURE — 84520 ASSAY OF UREA NITROGEN: CPT | Performed by: FAMILY MEDICINE

## 2024-05-01 PROCEDURE — 80051 ELECTROLYTE PANEL: CPT | Performed by: FAMILY MEDICINE

## 2024-05-01 PROCEDURE — 84450 TRANSFERASE (AST) (SGOT): CPT | Performed by: FAMILY MEDICINE

## 2024-05-01 PROCEDURE — 83690 ASSAY OF LIPASE: CPT | Performed by: FAMILY MEDICINE

## 2024-05-01 PROCEDURE — 99396 PREV VISIT EST AGE 40-64: CPT | Performed by: FAMILY MEDICINE

## 2024-05-01 PROCEDURE — 82040 ASSAY OF SERUM ALBUMIN: CPT | Performed by: FAMILY MEDICINE

## 2024-05-01 PROCEDURE — G0103 PSA SCREENING: HCPCS | Performed by: FAMILY MEDICINE

## 2024-05-01 PROCEDURE — 99214 OFFICE O/P EST MOD 30 MIN: CPT | Mod: 25 | Performed by: FAMILY MEDICINE

## 2024-05-01 PROCEDURE — 82565 ASSAY OF CREATININE: CPT | Performed by: FAMILY MEDICINE

## 2024-05-01 PROCEDURE — 36415 COLL VENOUS BLD VENIPUNCTURE: CPT | Performed by: FAMILY MEDICINE

## 2024-05-01 PROCEDURE — 84460 ALANINE AMINO (ALT) (SGPT): CPT | Performed by: FAMILY MEDICINE

## 2024-05-01 PROCEDURE — 85025 COMPLETE CBC W/AUTO DIFF WBC: CPT | Performed by: FAMILY MEDICINE

## 2024-05-01 RX ORDER — SILDENAFIL CITRATE 20 MG/1
20 TABLET ORAL DAILY PRN
Qty: 30 TABLET | Refills: 11 | Status: SHIPPED | OUTPATIENT
Start: 2024-05-01

## 2024-05-01 NOTE — PATIENT INSTRUCTIONS
Preventive Care Advice   This is general advice given by our system to help you stay healthy. However, your care team may have specific advice just for you. Please talk to your care team about your preventive care needs.  Nutrition  Eat 5 or more servings of fruits and vegetables each day.  Try wheat bread, brown rice and whole grain pasta (instead of white bread, rice, and pasta).  Get enough calcium and vitamin D. Check the label on foods and aim for 100% of the RDA (recommended daily allowance).  Lifestyle  Exercise at least 150 minutes each week   (30 minutes a day, 5 days a week).  Do muscle strengthening activities 2 days a week. These help control your weight and prevent disease.  No smoking.  Wear sunscreen to prevent skin cancer.  Have a dental exam and cleaning every 6 months.  Yearly exams  See your health care team every year to talk about:  Any changes in your health.  Any medicines your care team has prescribed.  Preventive care, family planning, and ways to prevent chronic diseases.  Shots (vaccines)   HPV shots (up to age 26), if you've never had them before.  Hepatitis B shots (up to age 59), if you've never had them before.  COVID-19 shot: Get this shot when it's due.  Flu shot: Get a flu shot every year.  Tetanus shot: Get a tetanus shot every 10 years.  Pneumococcal, hepatitis A, and RSV shots: Ask your care team if you need these based on your risk.  Shingles shot (for age 50 and up).  General health tests  Diabetes screening:  Starting at age 35, Get screened for diabetes at least every 3 years.  If you are younger than age 35, ask your care team if you should be screened for diabetes.  Cholesterol test: At age 39, start having a cholesterol test every 5 years, or more often if advised.  Bone density scan (DEXA): At age 50, ask your care team if you should have this scan for osteoporosis (brittle bones).  Hepatitis C: Get tested at least once in your life.  STIs (sexually transmitted  infections)  Before age 24: Ask your care team if you should be screened for STIs.  After age 24: Get screened for STIs if you're at risk. You are at risk for STIs (including HIV) if:  You are sexually active with more than one person.  You don't use condoms every time.  You or a partner was diagnosed with a sexually transmitted infection.  If you are at risk for HIV, ask about PrEP medicine to prevent HIV.  Get tested for HIV at least once in your life, whether you are at risk for HIV or not.  Cancer screening tests  Cervical cancer screening: If you have a cervix, begin getting regular cervical cancer screening tests at age 21. Most people who have regular screenings with normal results can stop after age 65. Talk about this with your provider.  Breast cancer scan (mammogram): If you've ever had breasts, begin having regular mammograms starting at age 40. This is a scan to check for breast cancer.  Colon cancer screening: It is important to start screening for colon cancer at age 45.  Have a colonoscopy test every 10 years (or more often if you're at risk) Or, ask your provider about stool tests like a FIT test every year or Cologuard test every 3 years.  To learn more about your testing options, visit: [email or web address]://[email or web address].[email or web address].pdf.  For help making a decision, visit: https://bit.ly/sr01579.  Prostate cancer screening test: If you have a prostate and are age 55 to 69, ask your provider if you would benefit from a yearly prostate cancer screening test.  Lung cancer screening: If you are a current or former smoker age 50 to 80, ask your care team if ongoing lung cancer screenings are right for you.  For informational purposes only. Not to replace the advice of your health care provider. Copyright   2023 [employer]. All rights reserved. Clinically reviewed by the [facility] Transitions Program. [employer] [id number] - REV 01/24.    Learning About Stress  What is stress?     Stress is your  body's response to a hard situation. Your body can have a physical, emotional, or mental response. Stress is a fact of life for most people, and it affects everyone differently. What causes stress for you may not be stressful for someone else.  A lot of things can cause stress. You may feel stress when you go on a job interview, take a test, or run a race. This kind of short-term stress is normal and even useful. It can help you if you need to work hard or react quickly. For example, stress can help you finish an important job on time.  Long-term stress is caused by ongoing stressful situations or events. Examples of long-term stress include long-term health problems, ongoing problems at work, or conflicts in your family. Long-term stress can harm your health.  How does stress affect your health?  When you are stressed, your body responds as though you are in danger. It makes hormones that speed up your heart, make you breathe faster, and give you a burst of energy. This is called the fight-or-flight stress response. If the stress is over quickly, your body goes back to normal and no harm is done.  But if stress happens too often or lasts too long, it can have bad effects. Long-term stress can make you more likely to get sick, and it can make symptoms of some diseases worse. If you tense up when you are stressed, you may develop neck, shoulder, or low back pain. Stress is linked to high blood pressure and heart disease.  Stress also harms your emotional health. It can make you chang, tense, or depressed. Your relationships may suffer, and you may not do well at work or school.  What can you do to manage stress?  You can try these things to help manage stress:   Do something active. Exercise or activity can help reduce stress. Walking is a great way to get started. Even everyday activities such as housecleaning or yard work can help.  Try yoga or joyce chi. These techniques combine exercise and meditation. You may need  some training at first to learn them.  Do something you enjoy. For example, listen to music or go to a movie. Practice your hobby or do volunteer work.  Meditate. This can help you relax, because you are not worrying about what happened before or what may happen in the future.  Do guided imagery. Imagine yourself in any setting that helps you feel calm. You can use online videos, books, or a teacher to guide you.  Do breathing exercises. For example:  From a standing position, bend forward from the waist with your knees slightly bent. Let your arms dangle close to the floor.  Breathe in slowly and deeply as you return to a standing position. Roll up slowly and lift your head last.  Hold your breath for just a few seconds in the standing position.  Breathe out slowly and bend forward from the waist.  Let your feelings out. Talk, laugh, cry, and express anger when you need to. Talking with supportive friends or family, a counselor, or a jade leader about your feelings is a healthy way to relieve stress. Avoid discussing your feelings with people who make you feel worse.  Write. It may help to write about things that are bothering you. This helps you find out how much stress you feel and what is causing it. When you know this, you can find better ways to cope.  What can you do to prevent stress?  You might try some of these things to help prevent stress:  Manage your time. This helps you find time to do the things you want and need to do.  Get enough sleep. Your body recovers from the stresses of the day while you are sleeping.  Get support. Your family, friends, and community can make a difference in how you experience stress.  Limit your news feed. Avoid or limit time on social media or news that may make you feel stressed.  Do something active. Exercise or activity can help reduce stress. Walking is a great way to get started.  Where can you learn more?  Go to https://www.healthwise.net/patiented  Enter N032 in the  "search box to learn more about \"Learning About Stress.\"  Current as of: October 24, 2023               Content Version: 14.0    9971-2623 [employer].   Care instructions adapted under license by your healthcare professional. If you have questions about a medical condition or this instruction, always ask your healthcare professional. [employer] disclaims any warranty or liability for your use of this information.      Substance Use Disorder: Care Instructions  Overview     You can improve your life and health by stopping your use of alcohol or drugs. When you don't drink or use drugs, you may feel and sleep better. You may get along better with your family, friends, and coworkers. There are medicines and programs that can help with substance use disorder.  How can you care for yourself at home?  Here are some ways to help you stay sober and prevent relapse.  If you have been given medicine to help keep you sober or reduce your cravings, be sure to take it exactly as prescribed.  Talk to your doctor about programs that can help you stop using drugs or drinking alcohol.  Do not keep alcohol or drugs in your home.  Plan ahead. Think about what you'll say if other people ask you to drink or use drugs. Try not to spend time with people who drink or use drugs.  Use the time and money spent on drinking or drugs to do something that's important to you.  Preventing a relapse  Have a plan to deal with relapse. Learn to recognize changes in your thinking that lead you to drink or use drugs. Get help before you start to drink or use drugs again.  Try to stay away from situations, friends, or places that may lead you to drink or use drugs.  If you feel the need to drink alcohol or use drugs again, seek help right away. Call a trusted friend or family member. Some people get support from organizations such as Narcotics Anonymous or [employer] or from treatment facilities.  If you relapse, get help " as soon as you can. Some people make a plan with another person that outlines what they want that person to do for them if they relapse. The plan usually includes how to handle the relapse and who to notify in case of relapse.  Don't give up. Remember that a relapse doesn't mean that you have failed. Use the experience to learn the triggers that lead you to drink or use drugs. Then quit again. Recovery is a lifelong process. Many people have several relapses before they are able to quit for good.  Follow-up care is a key part of your treatment and safety. Be sure to make and go to all appointments, and call your doctor if you are having problems. It's also a good idea to know your test results and keep a list of the medicines you take.  When should you call for help?   Call 911  anytime you think you may need emergency care. For example, call if you or someone else:    Has overdosed or has withdrawal signs. Be sure to tell the emergency workers that you are or someone else is using or trying to quit using drugs. Overdose or withdrawal signs may include:  Losing consciousness.  Seizure.  Seeing or hearing things that aren't there (hallucinations).     Is thinking or talking about suicide or harming others.   Where to get help 24 hours a day, 7 days a week   If you or someone you know talks about suicide, self-harm, a mental health crisis, a substance use crisis, or any other kind of emotional distress, get help right away. You can:    Call the Suicide and Crisis Lifeline at 984.     Call 9-575-341-TALK (1-733.845.8023).     Text HOME to 840651 to access the Crisis Text Line.   Consider saving these numbers in your phone.  Go to [employer] for more information or to chat online.  Call your doctor now or seek immediate medical care if:    You are having withdrawal symptoms. These may include nausea or vomiting, sweating, shakiness, and anxiety.   Watch closely for changes in your health, and be sure to contact  "your doctor if:    You have a relapse.     You need more help or support to stop.   Where can you learn more?  Go to https://www.healthDotspin.net/patiented  Enter H573 in the search box to learn more about \"Substance Use Disorder: Care Instructions.\"  Current as of: November 15, 2023               Content Version: 14.0    9240-8296 [employer].   Care instructions adapted under license by your healthcare professional. If you have questions about a medical condition or this instruction, always ask your healthcare professional. Healthwise, [employer] disclaims any warranty or liability for your use of this information.      "

## 2024-05-01 NOTE — PROGRESS NOTES
"Preventive Care Visit  [facility]  May 1, 2024      Assessment & Plan   Problem List Items Addressed This Visit    None  Visit Diagnoses       Routine general medical examination at a health care facility    -  Primary    Relevant Orders    Lipid panel reflex to direct LDL Fasting    Abdominal pain, epigastric        Relevant Orders    CT Abdomen Pelvis w Contrast    Lipase    CBC with platelets and differential    Comprehensive metabolic panel (BMP + Alb, Alk Phos, ALT, AST, Total. Bili, TP)    Screening for prostate cancer        Relevant Orders    PSA, screen    Erectile dysfunction, unspecified erectile dysfunction type        Relevant Medications    sildenafil (REVATIO) 20 MG tablet             Patient has been advised of split billing requirements and indicates understanding: Yes         BMI  Estimated body mass index is 28.84 kg/m  as calculated from the following:    Height as of this encounter: 1.778 m (5' 10\").    Weight as of this encounter: 91.2 kg (201 lb).       Counseling  Appropriate preventive services were discussed with this patient, including applicable screening as appropriate for fall prevention, nutrition, physical activity, Tobacco-use cessation, weight loss and cognition.  Checklist reviewing preventive services available has been given to the patient.  Reviewed patient's diet, addressing concerns and/or questions.   The patient was instructed to see the dentist every 6 months.   He is at risk for psychosocial distress and has been provided with information to reduce risk.           Tyra Early is a 63 year old, presenting for the following:  Physical  Of note, when he vomits he goes into Afib and needs conversion        5/1/2024     2:06 PM   Additional Questions   Roomed by Lorene HOPPER CMA        Health Care Directive  Patient does not have a Health Care Directive or Living Will: Discussed advance care planning with " patient; information given to patient to review.    HPI          4/30/2024   General Health   How would you rate your overall physical health? Good   Feel stress (tense, anxious, or unable to sleep) Only a little   (!) STRESS CONCERN      4/30/2024   Nutrition   Three or more servings of calcium each day? (!) NO   Diet: Breakfast skipped   How many servings of fruit and vegetables per day? (!) 2-3   How many sweetened beverages each day? 0-1         4/30/2024   Exercise   Days per week of moderate/strenous exercise 4 days   Average minutes spent exercising at this level 40 min         4/30/2024   Social Factors   Frequency of gathering with friends or relatives Once a week   Worry food won't last until get money to buy more No   Food not last or not have enough money for food? No   Do you have housing?  Yes   Are you worried about losing your housing? No   Lack of transportation? No   Unable to get utilities (heat,electricity)? No         4/30/2024   Fall Risk   Fallen 2 or more times in the past year? No   Trouble with walking or balance? No          4/30/2024   Dental   Dentist two times every year? (!) NO         4/30/2024   TB Screening   Were you born outside of the [place]? No         Today's PHQ-2 Score:       4/30/2024     6:45 PM   PHQ-2 ( 1999 [employer])   Q1: Little interest or pleasure in doing things 0   Q2: Feeling down, depressed or hopeless 0   PHQ-2 Score 0   Q1: Little interest or pleasure in doing things Not at all   Q2: Feeling down, depressed or hopeless Not at all   PHQ-2 Score 0           4/30/2024   Substance Use   Alcohol more than 3/day or more than 7/wk No   Do you use any other substances recreationally? (!) ALCOHOL     Social History     Tobacco Use    Smoking status: Never     Passive exposure: Never    Smokeless tobacco: Never    Tobacco comments:     no 2nd hand smoke exposure   Vaping Use    Vaping status: Never Used   Substance Use Topics    Alcohol use: Yes     Alcohol/week: 0.0 standard  "drinks of alcohol     Comment: 1/2 bottle of wine on Friday and Saturday evenings    Drug use: No             4/30/2024   One time HIV Screening   Previous HIV test? No         4/30/2024   STI Screening   New sexual partner(s) since last STI/HIV test? No   Last PSA:   PSA   Date Value Ref Range Status   08/02/2018 0.95 0 - 4 ug/L Final     Comment:     Assay Method:  Chemiluminescence using Siemens Vista analyzer     Prostate Specific Antigen Screen   Date Value Ref Range Status   05/05/2022 0.95 0.00 - 4.00 ug/L Final     ASCVD Risk   The 10-year ASCVD risk score (Mahendra ROBERTSON, et al., 2019) is: 9.1%    Values used to calculate the score:      Age: 63 years      Sex: Male      Is Non- : No      Diabetic: No      Tobacco smoker: No      Systolic Blood Pressure: 133 mmHg      Is BP treated: No      HDL Cholesterol: 73 mg/dL      Total Cholesterol: 176 mg/dL         Reviewed and updated as needed this visit by Provider                  Abdominal pain when exercising, throbbing type pain midline below his sternum, bloating.       Objective    Exam  /81 (BP Location: Right arm, Patient Position: Chair, Cuff Size: Adult Regular)   Pulse 71   Temp 97.5  F (36.4  C) (Temporal)   Resp 16   Ht 1.778 m (5' 10\")   Wt 91.2 kg (201 lb)   SpO2 97%   BMI 28.84 kg/m     Estimated body mass index is 28.84 kg/m  as calculated from the following:    Height as of this encounter: 1.778 m (5' 10\").    Weight as of this encounter: 91.2 kg (201 lb).    Physical Exam  GENERAL: alert and no distress  NECK: no adenopathy, no asymmetry, masses, or scars  RESP: lungs clear to auscultation - no rales, rhonchi or wheezes  CV: regular rate and rhythm, normal S1 S2, no S3 or S4, no murmur, click or rub, no peripheral edema  ABDOMEN: soft, nontender, no hepatosplenomegaly, no masses and bowel sounds normal  MS: no gross musculoskeletal defects noted, no edema  NEURO: Normal strength and tone, mentation " intact and speech normal  PSYCH: mentation appears normal, affect normal/bright        Signed Electronically by: RADHA JJ,

## 2024-05-02 PROBLEM — E87.6 HYPOKALEMIA: Status: RESOLVED | Noted: 2019-04-23 | Resolved: 2024-05-02

## 2024-05-02 LAB
ALBUMIN SERPL BCG-MCNC: 4.8 G/DL (ref 3.5–5.2)
ALP SERPL-CCNC: 65 U/L (ref 40–150)
ALT SERPL W P-5'-P-CCNC: 50 U/L (ref 0–70)
ANION GAP SERPL CALCULATED.3IONS-SCNC: 13 MMOL/L (ref 7–15)
AST SERPL W P-5'-P-CCNC: 44 U/L (ref 0–45)
BILIRUB SERPL-MCNC: 0.8 MG/DL
BUN SERPL-MCNC: 15.3 MG/DL (ref 8–23)
CALCIUM SERPL-MCNC: 9.9 MG/DL (ref 8.8–10.2)
CHLORIDE SERPL-SCNC: 102 MMOL/L (ref 98–107)
CHOLEST SERPL-MCNC: 167 MG/DL
CREAT SERPL-MCNC: 1.19 MG/DL (ref 0.67–1.17)
DEPRECATED HCO3 PLAS-SCNC: 24 MMOL/L (ref 22–29)
EGFRCR SERPLBLD CKD-EPI 2021: 68 ML/MIN/1.73M2
FASTING STATUS PATIENT QL REPORTED: YES
GLUCOSE SERPL-MCNC: ABNORMAL MG/DL
HDLC SERPL-MCNC: 60 MG/DL
LDLC SERPL CALC-MCNC: 98 MG/DL
LIPASE SERPL-CCNC: 38 U/L (ref 13–60)
NONHDLC SERPL-MCNC: 107 MG/DL
POTASSIUM SERPL-SCNC: 5.1 MMOL/L (ref 3.4–5.3)
PROT SERPL-MCNC: 7.7 G/DL (ref 6.4–8.3)
PSA SERPL DL<=0.01 NG/ML-MCNC: 0.85 NG/ML (ref 0–4.5)
SODIUM SERPL-SCNC: 139 MMOL/L (ref 135–145)
TRIGL SERPL-MCNC: 44 MG/DL

## 2024-05-03 ENCOUNTER — DOCUMENTATION ONLY (OUTPATIENT)
Dept: FAMILY MEDICINE | Facility: CLINIC | Age: 64
End: 2024-05-03
Payer: COMMERCIAL

## 2024-05-04 NOTE — PROGRESS NOTES
The glucose testing for Sharath Padilla from 5/1/2024 at 2:40 PM  was canceled at our testing lab due to lab error.  If you require a glucose result, please order a GLUCOSE test, and let your care team know to contact the patient to schedule a Lab Only appointment.  Thank you,  Bernadine Garner CMA([facility])

## 2024-05-10 ENCOUNTER — ANCILLARY PROCEDURE (OUTPATIENT)
Dept: CT IMAGING | Facility: CLINIC | Age: 64
End: 2024-05-10
Attending: FAMILY MEDICINE
Payer: COMMERCIAL

## 2024-05-10 DIAGNOSIS — R10.13 ABDOMINAL PAIN, EPIGASTRIC: ICD-10-CM

## 2024-05-10 PROCEDURE — 74177 CT ABD & PELVIS W/CONTRAST: CPT | Mod: TC | Performed by: RADIOLOGY

## 2024-05-10 RX ORDER — IOPAMIDOL 755 MG/ML
123 INJECTION, SOLUTION INTRAVASCULAR ONCE
Status: COMPLETED | OUTPATIENT
Start: 2024-05-10 | End: 2024-05-10

## 2024-05-10 RX ADMIN — IOPAMIDOL 123 ML: 755 INJECTION, SOLUTION INTRAVASCULAR at 07:51

## 2024-05-13 ENCOUNTER — MYC MEDICAL ADVICE (OUTPATIENT)
Dept: FAMILY MEDICINE | Facility: CLINIC | Age: 64
End: 2024-05-13
Payer: COMMERCIAL

## 2024-05-13 DIAGNOSIS — K86.2 PANCREATIC CYST: Primary | ICD-10-CM

## 2024-05-16 ENCOUNTER — TELEPHONE (OUTPATIENT)
Dept: GASTROENTEROLOGY | Facility: CLINIC | Age: 64
End: 2024-05-16
Payer: COMMERCIAL

## 2024-05-16 NOTE — TELEPHONE ENCOUNTER
Advanced Endoscopy     Referring provider: Brice Winn DO  [facility]    Referred to: Advanced Endoscopy Provider [facility]     Provider Requested: NA     Referral Received: 5/16/24     Records received: EPIC     Images received: PACS    Insurance Coverage: [facility]    Evaluation for: K86.2 (ICD-10-CM) - Pancreatic cyst     Clinical History (per RN review):     MRI scheduled for 5/31/24.    Father diagnosed with pancreatic cancer at age 68.     CT ABDOMEN AND PELVIS WITH CONTRAST 5/10/2024   CLINICAL HISTORY: Abdominal pain, epigastric.     PANCREAS: No acute abnormality. Prominent cystic mass at the pancreas  head measuring 2.6 cm, series 5 image 55. No main pancreatic duct  dilatation. No pancreas fluid collection otherwise seen.                                                              IMPRESSION:   1.  No acute abnormality identified.  2.  Cystic mass of the pancreas head. Recommend MRI for further  assessment.  3.  Indeterminate right adrenal nodule may also be further evaluated  by MRI.    MD review date: 5/17/24 Dr. Eric BARDALES Decision for clinic consultation/Orders:       Please schedule EUS in [facility] or OR whichever is earlier      Referral updates/Patient contacted: 5/17/24     I personally performed the service described in the documentation recorded by the scribe in my presence, and it accurately and completely records my words and actions.

## 2024-05-20 ENCOUNTER — PREP FOR PROCEDURE (OUTPATIENT)
Dept: GASTROENTEROLOGY | Facility: CLINIC | Age: 64
End: 2024-05-20
Payer: COMMERCIAL

## 2024-05-20 ENCOUNTER — PATIENT OUTREACH (OUTPATIENT)
Dept: GASTROENTEROLOGY | Facility: CLINIC | Age: 64
End: 2024-05-20
Payer: COMMERCIAL

## 2024-05-20 DIAGNOSIS — K86.2 PANCREATIC CYST: Primary | ICD-10-CM

## 2024-05-20 NOTE — PROGRESS NOTES
Please assist in scheduling:     Procedure/Imaging/Clinic: EGD/EUS with fine needle biopsy  Physician: Eric  Timing: Next available  Scope time: Provider average  Anesthesia: MAC  Dx: Pancreatic cyst  Tier:3  Location: [facility]  Patient communication letter header: EUS (Endoscopic Ultrasound) with sampling of pancreatic cyst.

## 2024-05-20 NOTE — PROGRESS NOTES
Following review of referral per Dr. Reyes: Please schedule EUS in [facility] or OR whichever is earlier.     Procedure/Imaging/Clinic: EGD/EUS with fine needle biopsy  Physician: Eric  Timing: Next available  Scope time: Provider average  Anesthesia: MAC  Dx: Pancreatic cyst  Tier:3  Location: [facility]  Patient communication letter header: EUS (Endoscopic Ultrasound) with sampling of pancreatic cyst  Called to discuss with patient. Offered 6/10/24    Patient will need a , someone to stay with them for 24 hours and should stay in town for 24 hours (within 45 min of Hospital) post procedure.    Patient will need a pre-op physical within 30 days of procedure. If outside [facility] system, will need physical faxed to number [phone]   If you do not get a preop physical, your procedure could be cancelled, patient voiced understanding*    Preop Plan: PAC visit; Patient scheduling number provided    Does patient have any history of gastric bypass/gastric surgery/altered panc/bili anatomy? No    Does patient have [employer] insurance? No    Med Review    Blood thinner -  None  ASA - None  Diabetic - None   Injectable or oral medications for weight loss - None    Patient Education r/t procedure:Discussion / MyChart    A pre-op nurse will call 1-2 days prior to the procedure.    NPO/Prep: No solid food 8 hours prior to arrival at the hospital. Clear liquids okay until 2 hours prior to arrival.     Other specific details/comments: Patient will cancel upcoming. Patient's father was diagnosed with pancreatic cancer at age 68. Mother with lymphoma.     Advised to contact clinic in the event of Covid symptoms or known exposure within 14 days of procedure: Pre-procedure    Verbalized understanding of all instructions. All questions answered. Clinic contact and scheduling numbers verified for future questions/concerns. Message routed to OR scheduling.     Leslie Owens RN, BSN  Care Coordinator  Advanced Endoscopy

## 2024-05-21 NOTE — TELEPHONE ENCOUNTER
FUTURE VISIT INFORMATION      SURGERY INFORMATION:  6/10 with Dr. Reyes @      RECORDS REQUESTED FROM:       Primary Care Provider: [facility]    Pertinent Medical History: PAF    Most recent EKG+ Tracin22- [facility]

## 2024-05-28 ENCOUNTER — VIRTUAL VISIT (OUTPATIENT)
Dept: SURGERY | Facility: CLINIC | Age: 64
End: 2024-05-28
Payer: COMMERCIAL

## 2024-05-28 ENCOUNTER — ANESTHESIA EVENT (OUTPATIENT)
Dept: SURGERY | Facility: CLINIC | Age: 64
End: 2024-05-28
Payer: COMMERCIAL

## 2024-05-28 ENCOUNTER — PRE VISIT (OUTPATIENT)
Dept: SURGERY | Facility: CLINIC | Age: 64
End: 2024-05-28

## 2024-05-28 VITALS — BODY MASS INDEX: 28.63 KG/M2 | HEIGHT: 70 IN | WEIGHT: 200 LBS

## 2024-05-28 DIAGNOSIS — K86.2 PANCREATIC CYST: ICD-10-CM

## 2024-05-28 DIAGNOSIS — Z01.818 PREOP EXAMINATION: Primary | ICD-10-CM

## 2024-05-28 PROCEDURE — 99203 OFFICE O/P NEW LOW 30 MIN: CPT | Mod: 95 | Performed by: NURSE PRACTITIONER

## 2024-05-28 ASSESSMENT — ENCOUNTER SYMPTOMS
DYSRHYTHMIAS: 1
ORTHOPNEA: 0

## 2024-05-28 ASSESSMENT — PAIN SCALES - GENERAL: PAINLEVEL: NO PAIN (0)

## 2024-05-28 NOTE — PROGRESS NOTES
Sharath is a 63 year old who is being evaluated via a billable video visit.    How would you like to obtain your AVS? [email or web address]  If the video visit is dropped, the invitation should be resent by: Text to cell phone: [phone]

## 2024-05-28 NOTE — H&P
Pre-Operative H & P     CC:  Preoperative exam to assess for increased cardiopulmonary risk while undergoing surgery and anesthesia.    Date of Encounter: 5/28/2024  Primary Care Physician:  Amado Watson     Reason for visit:   Encounter Diagnoses   Name Primary?    Preop examination Yes    Pancreatic cyst        HPI  Edin Padilla is a 63 year old male who presents for pre-operative H & P in preparation for  Procedure Information       Case: [id number] Date/Time: 06/10/24 1315    Procedure: ESOPHAGOGASTRODUODENOSCOPY, WITH FINE NEEDLE ASPIRATION BIOPSY, WITH ENDOSCOPIC ULTRASOUND GUIDANCE (Esophagus)    Anesthesia type: MAC    Diagnosis: Pancreatic cyst [K86.2]    Pre-op diagnosis: Pancreatic cyst [K86.2]    Location:  OR  /  OR    Providers: Guru Libby Reyes MD            Edin Padilla is a 63 year old male with atrial fibrillation, possible sleep apnea, GERD and erectile dysfunction that has a pancreatic cyst  He had a CT scan done on 5/10/24 to evaluate epigastric pain and he was noted to have a cystic mass on the pancreatic head.  The above listed procedure has been recommended for evaluation.     History is obtained from the patient and chart review    Hx of abnormal bleeding or anti-platelet use: none      Past Medical History  Past Medical History:   Diagnosis Date    A-fib (H) 2008    History of cardioversion 2008, 2012    Vertigo        Past Surgical History  Past Surgical History:   Procedure Laterality Date    CARDIOVERSION  2022    a-fib    COLONOSCOPY      ESOPHAGOSCOPY, GASTROSCOPY, DUODENOSCOPY (EGD), COMBINED  01/07/2014    Procedure: COMBINED ESOPHAGOSCOPY, GASTROSCOPY, DUODENOSCOPY (EGD), BIOPSY SINGLE OR MULTIPLE;  EGD-UPPER GI SYPTOMS / LEISA;  Surgeon: Mukund Au MD;  Location:  OR    TONSILLECTOMY & ADENOIDECTOMY         Prior to Admission Medications  Current Outpatient Medications   Medication Sig Dispense Refill    ondansetron (ZOFRAN) 4  MG tablet Take 1 tablet (4 mg) by mouth every 8 hours as needed for nausea 9 tablet 1    sildenafil (REVATIO) 20 MG tablet Take 1 tablet (20 mg) by mouth daily as needed (45 minutes before sexual activity) 30 tablet 11       Allergies  Allergies   Allergen Reactions    Other [Seasonal Allergies]      Hayfever. Eye irritation. Nasal discharge.       Social History  Social History     Socioeconomic History    Marital status:      Spouse name: Not on file    Number of children: 2    Years of education: Not on file    Highest education level: Not on file   Occupational History    Occupation:    Tobacco Use    Smoking status: Never     Passive exposure: Never    Smokeless tobacco: Never    Tobacco comments:     no 2nd hand smoke exposure   Vaping Use    Vaping status: Never Used   Substance and Sexual Activity    Alcohol use: Yes     Alcohol/week: 0.0 standard drinks of alcohol     Comment: 1/2 bottle of wine on Friday and Saturday evenings    Drug use: No    Sexual activity: Yes     Partners: Female   Other Topics Concern    Parent/sibling w/ CABG, MI or angioplasty before 65F 55M? Not Asked     Service No    Blood Transfusions No    Caffeine Concern No    Occupational Exposure No    Hobby Hazards No    Sleep Concern No    Stress Concern No    Weight Concern No    Special Diet No    Back Care No    Exercise No    Bike Helmet No    Seat Belt No    Self-Exams No   Social History Narrative    Not on file     Social Determinants of Health     Financial Resource Strain: Low Risk  (4/30/2024)    Financial Resource Strain     Within the past 12 months, have you or your family members you live with been unable to get utilities (heat, electricity) when it was really needed?: No   Food Insecurity: Low Risk  (4/30/2024)    Food Insecurity     Within the past 12 months, did you worry that your food would run out before you got money to buy more?: No     Within the past 12 months, did the food you bought just  not last and you didn t have money to get more?: No   Transportation Needs: Low Risk  (4/30/2024)    Transportation Needs     Within the past 12 months, has lack of transportation kept you from medical appointments, getting your medicines, non-medical meetings or appointments, work, or from getting things that you need?: No   Physical Activity: Sufficiently Active (4/30/2024)    Exercise Vital Sign     Days of Exercise per Week: 4 days     Minutes of Exercise per Session: 40 min   Stress: No Stress Concern Present (4/30/2024)    [place] [employer] - Occupational Stress Questionnaire     Feeling of Stress : Only a little   Social Connections: Unknown (4/30/2024)    Social Connection and Isolation Panel [NHANES]     Frequency of Communication with Friends and Family: Not on file     Frequency of Social Gatherings with Friends and Family: Once a week     Attends Restorationism Services: Not on file     Active Member of Clubs or Organizations: Not on file     Attends Club or Organization Meetings: Not on file     Marital Status: Not on file   Interpersonal Safety: Low Risk  (5/1/2024)    Interpersonal Safety     Do you feel physically and emotionally safe where you currently live?: Yes     Within the past 12 months, have you been hit, slapped, kicked or otherwise physically hurt by someone?: No     Within the past 12 months, have you been humiliated or emotionally abused in other ways by your partner or ex-partner?: No   Housing Stability: Low Risk  (4/30/2024)    Housing Stability     Do you have housing? : Yes     Are you worried about losing your housing?: No       Family History  Family History   Problem Relation Age of Onset    Cancer Mother         lymphoma    Thyroid Disease Mother     Hypertension Father     Cerebrovascular Disease Paternal Grandmother     Diabetes No family hx of     Glaucoma No family hx of     Macular Degeneration No family hx of     Anesthesia Reaction No family hx of      Thrombosis No family hx of        Review of Systems  The complete review of systems is negative other than noted in the HPI or here.   Anesthesia Evaluation   Pt has had prior anesthetic.     History of anesthetic complications  - .  woke up during prior endoscopy with conscious sedation and tried to pull out the scope.    ROS/MED HX  ENT/Pulmonary:     (+)     GILLES risk factors, snores loudly,    observed stopped breathing,                           (-) recent URI   Neurologic:  - neg neurologic ROS     Cardiovascular: Comment: A-fib s/p cardioversion 2 years ago.  No recurrence     (+)  - -   -  - -                        dysrhythmias, a-fib,        Previous cardiac testing   Echo: Date: 2019 Results:  Echo   2019  Final Impressions   1. Normal left ventricular chamber size and wall thickness. Calculated left ventricular   ejection fraction 64 %.   2. No regional wall motion abnormalities.   3. Normal sized atria.   4. Normal right ventricular size and RV function.   5. No significant valvular heart disease.   6. Normal inferior vena cava size with normal inspiratory collapse (>50%).   Stress Test:  Date: Results:    ECG Reviewed:  Date: 2022 Results:  NSR  Cath:  Date: Results:   (-) KELLY and orthopnea/PND   METS/Exercise Tolerance:  Comment: Walks on the treadmill several times per week at 4mph with incline.  Denies any exertional dyspnea or angina.    Hematologic:  - neg hematologic  ROS     Musculoskeletal:  - neg musculoskeletal ROS     GI/Hepatic: Comment: Presumed GERD due to ongoing epigastric pain - no meds    (+) GERD, Symptomatic,                  Renal/Genitourinary: Comment: Erectile dysfuntion      Endo: Comment: Pancreatic cyst      Psychiatric/Substance Use:  - neg psychiatric ROS     Infectious Disease:  - neg infectious disease ROS     Malignancy:  - neg malignancy ROS     Other:  - neg other ROS          Virtual visit -  No vitals were obtained    Physical Exam  Constitutional: Awake, alert,  cooperative, no apparent distress, and appears stated age.  Eyes: Pupils equal  HENT: Normocephalic  Respiratory: non labored breathing   Neurologic: Awake, alert, oriented to name, place and time.   Neuropsychiatric: Calm, cooperative. Normal affect.      Prior Labs/Diagnostic Studies   All labs and imaging personally reviewed     EKG/ stress test - if available please see in ROS above       Component      Latest Ref Rng 5/1/2024  2:40 PM   WBC      4.0 - 11.0 10e3/uL 6.0    RBC Count      4.40 - 5.90 10e6/uL 5.24    Hemoglobin      13.3 - 17.7 g/dL 15.2    Hematocrit      40.0 - 53.0 % 44.5    MCV      78 - 100 fL 85    MCH      26.5 - 33.0 pg 29.0    MCHC      31.5 - 36.5 g/dL 34.2    RDW      10.0 - 15.0 % 12.0    Platelet Count      150 - 450 10e3/uL 224    % Neutrophils      % 55    % Lymphocytes      % 34    % Monocytes      % 8    % Eosinophils      % 3    % Basophils      % 1    % Immature Granulocytes      % 0    Absolute Neutrophils      1.6 - 8.3 10e3/uL 3.3    Absolute Lymphocytes      0.8 - 5.3 10e3/uL 2.0    Absolute Monocytes      0.0 - 1.3 10e3/uL 0.5    Absolute Eosinophils      0.0 - 0.7 10e3/uL 0.2    Absolute Basophils      0.0 - 0.2 10e3/uL 0.1    Absolute Immature Granulocytes      <=0.4 10e3/uL 0.0    Sodium      135 - 145 mmol/L 139    Potassium      3.4 - 5.3 mmol/L 5.1    Carbon Dioxide (CO2)      22 - 29 mmol/L 24    Anion Gap      7 - 15 mmol/L 13    Urea Nitrogen      8.0 - 23.0 mg/dL 15.3    Creatinine      0.67 - 1.17 mg/dL 1.19 (H)    GFR Estimate      >60 mL/min/1.73m2 68    Calcium      8.8 - 10.2 mg/dL 9.9    Chloride      98 - 107 mmol/L 102    Glucose -- (C)   Alkaline Phosphatase      40 - 150 U/L 65    AST      0 - 45 U/L 44    ALT      0 - 70 U/L 50    Protein Total      6.4 - 8.3 g/dL 7.7    Albumin      3.5 - 5.2 g/dL 4.8    Bilirubin Total      <=1.2 mg/dL 0.8       Legend:  (H) High  (C) Corrected    The patient's records and results personally reviewed by this provider.  "    Outside records reviewed from: Care Everywhere      Assessment    Edin Padilla is a 63 year old male seen as a PAC referral for risk assessment and optimization for anesthesia.    Plan/Recommendations  Pt will be optimized for the proposed procedure.  See below for details on the assessment, risk, and preoperative recommendations    NEUROLOGY  - No history of TIA, CVA or seizure    -Post Op delirium risk factors:  No risk identified    ENT  - No current airway concerns.  Will need to be reassessed day of surgery.  Mallampati: Unable to assess  TM: Unable to assess    CARDIAC  - No history of CAD and Hypertension  - s/p multiple prior cardioversions for A-fib with the last being approximately 2 years ago.  Denies any symptoms of recurrence.  Has never required anticoagulation.  - CHADSVASC = 0    - METS (Metabolic Equivalents)  Patient performs 4 or more METS exercise without symptoms             Total Score: 0      RCRI-Very low risk: Class 1 0.4% complication rate             Total Score: 0        PULMONARY    GILLES Medium Risk             Total Score: 4    GILLES: Snores loudly    GILLES: Observed stopped breathing    GILLES: Over 50 ys old    GILLES: Male      - Denies asthma or inhaler use  - Tobacco History    History   Smoking Status    Never   Smokeless Tobacco    Never       GI  - patient assumes he has GERD due to having ongoing epigastric pain.  No GERD meds.    - pancreatic cyst.  Procedure planned as above.  PONV Low Risk  Total Score: 1           1 AN PONV: Patient is not a current smoker        /RENAL  - hold revatio 24 hours prior to surgery.     ENDOCRINE  \  - BMI: Estimated body mass index is 28.7 kg/m  as calculated from the following:    Height as of this encounter: 1.778 m (5' 10\").    Weight as of this encounter: 90.7 kg (200 lb).  Overweight (BMI 25.0-29.9)  - No history of Diabetes Mellitus    HEME  VTE Low Risk 0.5%             Total Score: 3    VTE: Greater than 59 yrs old    VTE: Male      - " No history of abnormal bleeding or antiplatelet use.    Anesthesia  - patient notes that he woke up during his last endoscopy and attempted to pull the scope out.  Per chart review this was done with conscious sedation.  Now scheduled for MAC with this procedure.         Different anesthesia methods/types have been discussed with the patient, but they are aware that the final plan will be decided by the assigned anesthesia provider on the date of service.      The patient is optimized for their procedure. AVS with information on surgery time/arrival time, meds and NPO status given by nursing staff. No further diagnostic testing indicated.    Please refer to the physical examination documented by the anesthesiologist in the anesthesia record on the day of surgery.    Video-Visit Details    Type of service:  Video Visit    Provider received verbal consent for a Video Visit from the patient? Yes     Originating Location (pt. Location): Other at work    Distant Location (provider location):  Off-site  Mode of Communication:  Video Conference via Kiio    On the day of service:     Prep time: 7 minutes  Visit time: 13 minutes  Documentation time: 11 minutes  ------------------------------------------  Total time: 31 minutes      MANDY Morillo CNP  Preoperative Assessment Center  [facility]  Phone: [phone]  Fax: [phone]

## 2024-05-28 NOTE — PATIENT INSTRUCTIONS
Preparing for Your Surgery      Name:  Edin Padilla   MRN:  [id number]   :  1960   Today's Date:  2024       Arriving for surgery:  Surgery date:  6/10/24  Arrival time:  11:00 am      Please come to:      [facility] [place] [facility]    [address] [place]  [address]     The [facility] ([facility] [place] Patient/Visitor Ramp is at [address]. Patients and visitors who self-park will receive the reduced hospital parking rate. If the Patient /Visitor Ramp is full, please follow the signs to the [employer] car park located at the main hospital entrance.       parking is available (24 hours/ 7 days a week)      Discounted parking pass options are available for patients and visitors. They can be purchased at the [employer] desk at the [facility] hospital entrance.     -    Stop at the security desk and they will direct surgery patients to the Surgery Check in and Family Lounge. [phone]        - If you need directions, a wheelchair or an escort please stop at the Information/security desk in the lobby.     What can I eat or drink?  -  You may eat and drink normally up to 8 hours prior to arrival time.  -  You may have clear liquids until 2 hours prior to arrival time.     Examples of clear liquids:  Water  Clear broth  Juices (apple, white grape, white cranberry  and cider) without pulp  Noncarbonated, powder based beverages  (lemonade and Mukund-Aid)  Sodas (Sprite, 7-Up, ginger ale and seltzer)  Coffee or tea (without milk or cream)  Gatorade    -  No Alcohol or cannabis products for at least 24 hours before surgery.     Which medicines can I take?    Hold Aspirin for 7 days before surgery.   Hold Multivitamins for 7 days before surgery.  Hold Supplements for 7 days before surgery.  Hold Ibuprofen (Advil, Motrin) for 1 day(s) before surgery--unless otherwise directed by surgeon.  Hold Naproxen (Aleve) for 4  days before surgery.    Hold revatio (sildenafil) x 24 hours before surgery.    -  PLEASE TAKE these medications the day of surgery:  Tylenol if needed.    How do I prepare myself?  - Please take 2 showers (one the night prior to surgery and one the morning of surgery) using Scrubcare or Hibiclens soap.    Use this soap only from the neck to your toes.     Leave the soap on your skin for one minute--then rinse thoroughly.      You may use your own shampoo and conditioner. No other hair products.   - Please remove all jewelry and body piercings.  - No lotions, deodorants or fragrance.  - No makeup or fingernail polish.   - Bring your ID and insurance card.    -If you use a CPAP machine, please bring the CPAP machine, tubing, and mask to hospital.    -If you have a Deep Brain Stimulator, Spinal Cord Stimulator, or any Neuro Stimulator device---you must bring the remote control to the hospital.      ALL PATIENTS GOING HOME THE SAME DAY OF SURGERY ARE REQUIRED TO HAVE A RESPONSIBLE ADULT TO DRIVE AND BE IN ATTENDANCE WITH THEM FOR 24 HOURS FOLLOWING SURGERY.    Covid testing policy as of 12/06/2022  Your surgeon will notify and schedule you for a COVID test if one is needed before surgery--please direct any questions or COVID symptoms to your surgeon      Questions or Concerns:    - For any questions regarding the day of surgery or your hospital stay, please contact the Pre Admission Nursing Office at [phone].       - If you have health changes between today and your surgery, please call your surgeon.       - For questions after surgery, please call your surgeons office.           Current Visitor Guidelines    You may have 2 visitors in the pre op area.    Visiting hours: 8 a.m. to 8:30 p.m.    Patients confirmed or suspected to have symptoms of COVID 19 or flu:     No visitors allowed for adult patients.   Children (under age 18) can have 1 named visitor.     People who are sick or showing symptoms of COVID 19 or  flu:    Are not allowed to visit patients--we can only make exceptions in special situations.       Please follow these guidelines for your visit:          Please maintain social distance          Masking is optional--however at times you may be asked to wear a mask for the safety of yourself and others     Clean your hands with alcohol hand . Do this when you arrive at and leave the building and patient room,    And again after you touch your mask or anything in the room.     Go directly to and from the room you are visiting.     Stay in the patient s room during your visit. Limit going to other places in the hospital as much as possible     Leave bags and jackets at home or in the car.     For everyone s health, please don t come and go during your visit. That includes for smoking   during your visit.

## 2024-06-10 ENCOUNTER — HOSPITAL ENCOUNTER (OUTPATIENT)
Facility: CLINIC | Age: 64
Discharge: HOME | End: 2024-06-10
Attending: INTERNAL MEDICINE | Admitting: INTERNAL MEDICINE
Payer: COMMERCIAL

## 2024-06-10 ENCOUNTER — ANESTHESIA (OUTPATIENT)
Dept: SURGERY | Facility: CLINIC | Age: 64
End: 2024-06-10
Payer: COMMERCIAL

## 2024-06-10 VITALS
WEIGHT: 199.08 LBS | SYSTOLIC BLOOD PRESSURE: 123 MMHG | OXYGEN SATURATION: 100 % | DIASTOLIC BLOOD PRESSURE: 91 MMHG | RESPIRATION RATE: 18 BRPM | HEIGHT: 70 IN | BODY MASS INDEX: 28.5 KG/M2 | TEMPERATURE: 97.9 F

## 2024-06-10 DIAGNOSIS — K86.2 PANCREAS CYST: Primary | ICD-10-CM

## 2024-06-10 LAB
AMYLASE FLD-CCNC: 22 U/L
CEA FLD-MCNC: <0.6 NG/ML
GLUCOSE FLD-MCNC: 95 MG/DL
UPPER EUS: NORMAL

## 2024-06-10 PROCEDURE — 360N000076 HC SURGERY LEVEL 3, PER MIN: Performed by: INTERNAL MEDICINE

## 2024-06-10 PROCEDURE — 88108 CYTOPATH CONCENTRATE TECH: CPT | Mod: 26 | Performed by: PATHOLOGY

## 2024-06-10 PROCEDURE — 88342 IMHCHEM/IMCYTCHM 1ST ANTB: CPT | Mod: 26 | Performed by: PATHOLOGY

## 2024-06-10 PROCEDURE — 258N000003 HC RX IP 258 OP 636: Mod: JZ | Performed by: REGISTERED NURSE

## 2024-06-10 PROCEDURE — 272N000002 HC OR SUPPLY OTHER OPNP: Performed by: INTERNAL MEDICINE

## 2024-06-10 PROCEDURE — 250N000009 HC RX 250: Performed by: REGISTERED NURSE

## 2024-06-10 PROCEDURE — 43238 EGD US FINE NEEDLE BX/ASPIR: CPT | Performed by: REGISTERED NURSE

## 2024-06-10 PROCEDURE — 710N000012 HC RECOVERY PHASE 2, PER MINUTE: Performed by: INTERNAL MEDICINE

## 2024-06-10 PROCEDURE — 250N000009 HC RX 250: Performed by: INTERNAL MEDICINE

## 2024-06-10 PROCEDURE — 88305 TISSUE EXAM BY PATHOLOGIST: CPT | Mod: TC | Performed by: INTERNAL MEDICINE

## 2024-06-10 PROCEDURE — 250N000011 HC RX IP 250 OP 636: Performed by: REGISTERED NURSE

## 2024-06-10 PROCEDURE — 88173 CYTOPATH EVAL FNA REPORT: CPT | Mod: 26 | Performed by: PATHOLOGY

## 2024-06-10 PROCEDURE — 272N000001 HC OR GENERAL SUPPLY STERILE: Performed by: INTERNAL MEDICINE

## 2024-06-10 PROCEDURE — 88313 SPECIAL STAINS GROUP 2: CPT | Mod: 26 | Performed by: PATHOLOGY

## 2024-06-10 PROCEDURE — 370N000017 HC ANESTHESIA TECHNICAL FEE, PER MIN: Performed by: INTERNAL MEDICINE

## 2024-06-10 PROCEDURE — 82150 ASSAY OF AMYLASE: CPT | Performed by: INTERNAL MEDICINE

## 2024-06-10 PROCEDURE — 88305 TISSUE EXAM BY PATHOLOGIST: CPT | Mod: 26 | Performed by: PATHOLOGY

## 2024-06-10 PROCEDURE — 999N000141 HC STATISTIC PRE-PROCEDURE NURSING ASSESSMENT: Performed by: INTERNAL MEDICINE

## 2024-06-10 RX ORDER — ONDANSETRON 4 MG/1
4 TABLET, ORALLY DISINTEGRATING ORAL EVERY 6 HOURS PRN
Status: DISCONTINUED | OUTPATIENT
Start: 2024-06-10 | End: 2024-06-10 | Stop reason: HOSPADM

## 2024-06-10 RX ORDER — NALOXONE HYDROCHLORIDE 0.4 MG/ML
0.2 INJECTION, SOLUTION INTRAMUSCULAR; INTRAVENOUS; SUBCUTANEOUS
Status: DISCONTINUED | OUTPATIENT
Start: 2024-06-10 | End: 2024-06-10 | Stop reason: HOSPADM

## 2024-06-10 RX ORDER — LIDOCAINE 40 MG/G
CREAM TOPICAL
Status: DISCONTINUED | OUTPATIENT
Start: 2024-06-10 | End: 2024-06-10 | Stop reason: HOSPADM

## 2024-06-10 RX ORDER — LEVOFLOXACIN 500 MG/1
500 TABLET, FILM COATED ORAL DAILY
Qty: 5 TABLET | Refills: 0 | Status: SHIPPED | OUTPATIENT
Start: 2024-06-11 | End: 2024-06-16

## 2024-06-10 RX ORDER — LIDOCAINE HYDROCHLORIDE 20 MG/ML
INJECTION, SOLUTION INFILTRATION; PERINEURAL PRN
Status: DISCONTINUED | OUTPATIENT
Start: 2024-06-10 | End: 2024-06-10

## 2024-06-10 RX ORDER — NALOXONE HYDROCHLORIDE 0.4 MG/ML
0.4 INJECTION, SOLUTION INTRAMUSCULAR; INTRAVENOUS; SUBCUTANEOUS
Status: DISCONTINUED | OUTPATIENT
Start: 2024-06-10 | End: 2024-06-10 | Stop reason: HOSPADM

## 2024-06-10 RX ORDER — PROPOFOL 10 MG/ML
INJECTION, EMULSION INTRAVENOUS CONTINUOUS PRN
Status: DISCONTINUED | OUTPATIENT
Start: 2024-06-10 | End: 2024-06-10

## 2024-06-10 RX ORDER — PROPOFOL 10 MG/ML
INJECTION, EMULSION INTRAVENOUS PRN
Status: DISCONTINUED | OUTPATIENT
Start: 2024-06-10 | End: 2024-06-10

## 2024-06-10 RX ORDER — ONDANSETRON 2 MG/ML
INJECTION INTRAMUSCULAR; INTRAVENOUS PRN
Status: DISCONTINUED | OUTPATIENT
Start: 2024-06-10 | End: 2024-06-10

## 2024-06-10 RX ORDER — LEVOFLOXACIN 5 MG/ML
INJECTION, SOLUTION INTRAVENOUS PRN
Status: DISCONTINUED | OUTPATIENT
Start: 2024-06-10 | End: 2024-06-10

## 2024-06-10 RX ORDER — SODIUM CHLORIDE, SODIUM LACTATE, POTASSIUM CHLORIDE, CALCIUM CHLORIDE 600; 310; 30; 20 MG/100ML; MG/100ML; MG/100ML; MG/100ML
INJECTION, SOLUTION INTRAVENOUS CONTINUOUS PRN
Status: DISCONTINUED | OUTPATIENT
Start: 2024-06-10 | End: 2024-06-10

## 2024-06-10 RX ORDER — GLYCOPYRROLATE 0.2 MG/ML
INJECTION, SOLUTION INTRAMUSCULAR; INTRAVENOUS PRN
Status: DISCONTINUED | OUTPATIENT
Start: 2024-06-10 | End: 2024-06-10

## 2024-06-10 RX ORDER — FLUMAZENIL 0.1 MG/ML
0.2 INJECTION, SOLUTION INTRAVENOUS
Status: DISCONTINUED | OUTPATIENT
Start: 2024-06-10 | End: 2024-06-10 | Stop reason: HOSPADM

## 2024-06-10 RX ORDER — ONDANSETRON 2 MG/ML
4 INJECTION INTRAMUSCULAR; INTRAVENOUS EVERY 6 HOURS PRN
Status: DISCONTINUED | OUTPATIENT
Start: 2024-06-10 | End: 2024-06-10 | Stop reason: HOSPADM

## 2024-06-10 RX ADMIN — LEVOFLOXACIN 500 MG: 5 INJECTION, SOLUTION INTRAVENOUS at 12:53

## 2024-06-10 RX ADMIN — GLYCOPYRROLATE 0.2 MG: 0.2 INJECTION, SOLUTION INTRAMUSCULAR; INTRAVENOUS at 13:02

## 2024-06-10 RX ADMIN — PROPOFOL 20 MG: 10 INJECTION, EMULSION INTRAVENOUS at 13:04

## 2024-06-10 RX ADMIN — PROPOFOL 125 MCG/KG/MIN: 10 INJECTION, EMULSION INTRAVENOUS at 12:56

## 2024-06-10 RX ADMIN — PROPOFOL 20 MG: 10 INJECTION, EMULSION INTRAVENOUS at 12:59

## 2024-06-10 RX ADMIN — MIDAZOLAM 2 MG: 1 INJECTION INTRAMUSCULAR; INTRAVENOUS at 12:49

## 2024-06-10 RX ADMIN — SODIUM CHLORIDE, POTASSIUM CHLORIDE, SODIUM LACTATE AND CALCIUM CHLORIDE: 600; 310; 30; 20 INJECTION, SOLUTION INTRAVENOUS at 12:51

## 2024-06-10 RX ADMIN — PROPOFOL 20 MG: 10 INJECTION, EMULSION INTRAVENOUS at 12:56

## 2024-06-10 RX ADMIN — LIDOCAINE HYDROCHLORIDE 80 MG: 20 INJECTION, SOLUTION INFILTRATION; PERINEURAL at 12:55

## 2024-06-10 RX ADMIN — ONDANSETRON 4 MG: 2 INJECTION INTRAMUSCULAR; INTRAVENOUS at 13:02

## 2024-06-10 RX ADMIN — PROPOFOL 30 MG: 10 INJECTION, EMULSION INTRAVENOUS at 13:06

## 2024-06-10 ASSESSMENT — ACTIVITIES OF DAILY LIVING (ADL)
ADLS_ACUITY_SCORE: 29
ADLS_ACUITY_SCORE: 29
ADLS_ACUITY_SCORE: 30
ADLS_ACUITY_SCORE: 29

## 2024-06-10 ASSESSMENT — COPD QUESTIONNAIRES: COPD: 0

## 2024-06-10 ASSESSMENT — LIFESTYLE VARIABLES: TOBACCO_USE: 0

## 2024-06-10 ASSESSMENT — ENCOUNTER SYMPTOMS
DYSRHYTHMIAS: 1
ORTHOPNEA: 0
SEIZURES: 0

## 2024-06-10 NOTE — DISCHARGE INSTRUCTIONS
Contacting your Doctor -   To contact a doctor, call Dr. Reyes at the Gastroenterology Clinic @ [phone] or:  [phone] and ask for the resident on call for Gastroenterology (answered 24 hours a day)   Emergency Department:  [facility]: [phone]  [facility]: [phone] 911 if you are in need of immediate or emergent help

## 2024-06-10 NOTE — ANESTHESIA PREPROCEDURE EVALUATION
Anesthesia Pre-Procedure Evaluation    Patient: Edin Padilla   MRN: [id number] : 1960        Procedure : Procedure(s):  ESOPHAGOGASTRODUODENOSCOPY, WITH FINE NEEDLE ASPIRATION BIOPSY, WITH ENDOSCOPIC ULTRASOUND GUIDANCE          Past Medical History:   Diagnosis Date    A-fib (H)     History of cardioversion 2012    Vertigo       Past Surgical History:   Procedure Laterality Date    CARDIOVERSION      a-fib    COLONOSCOPY      ESOPHAGOSCOPY, GASTROSCOPY, DUODENOSCOPY (EGD), COMBINED  2014    Procedure: COMBINED ESOPHAGOSCOPY, GASTROSCOPY, DUODENOSCOPY (EGD), BIOPSY SINGLE OR MULTIPLE;  EGD-UPPER GI SYPTOMS / LEISA;  Surgeon: Mukund Au MD;  Location: [facility] OR    TONSILLECTOMY & ADENOIDECTOMY        Allergies   Allergen Reactions    Other [Seasonal Allergies]      Hayfever. Eye irritation. Nasal discharge.      Social History     Tobacco Use    Smoking status: Never     Passive exposure: Never    Smokeless tobacco: Never    Tobacco comments:     no 2nd hand smoke exposure   Substance Use Topics    Alcohol use: Yes     Alcohol/week: 0.0 standard drinks of alcohol     Comment: 1/2 bottle of wine on Friday and Saturday evenings      Wt Readings from Last 1 Encounters:   24 90.7 kg (200 lb)        Anesthesia Evaluation   Pt has had prior anesthetic. Type: MAC.    History of anesthetic complications  - .  woke up during prior endoscopy with conscious sedation and tried to pull out the scope.    ROS/MED HX  ENT/Pulmonary: Comment:   Reports that he has difficulty breathing through his nose due to a deviated septum    (+)     GILLES risk factors (patient reports that he has sleep apnea, no official diagnosis), snores loudly,    observed stopped breathing,                           (-) tobacco use, asthma, COPD and recent URI   Neurologic:    (-) no seizures, no CVA, no TIA and migraines   Cardiovascular: Comment: A-fib s/p cardioversion 2 years ago.  No recurrence     (+)  - -   -   - -                        dysrhythmias, a-fib,        Previous cardiac testing   Echo: Date: 2019 Results:  Echo   2019  Final Impressions   1. Normal left ventricular chamber size and wall thickness. Calculated left ventricular   ejection fraction 64 %.   2. No regional wall motion abnormalities.   3. Normal sized atria.   4. Normal right ventricular size and RV function.   5. No significant valvular heart disease.   6. Normal inferior vena cava size with normal inspiratory collapse (>50%).   Stress Test:  Date: Results:    ECG Reviewed:  Date: 2022 Results:  NSR  Cath:  Date: Results:   (-) KELLY, orthopnea/PND, syncope and irregular heartbeat/palpitations (denies any palpitations since cardioversion)   METS/Exercise Tolerance: >4 METS Comment: Walks on the treadmill several times per week at 4mph with incline.  Denies any exertional dyspnea or angina.    Hematologic:       Musculoskeletal:       GI/Hepatic: Comment: Presumed GERD due to ongoing epigastric pain - no meds    (+) GERD, Symptomatic,                  Renal/Genitourinary: Comment:   Erectile dysfuntion      Endo: Comment: Pancreatic cyst   (-) Type II DM, thyroid disease and obesity   Psychiatric/Substance Use:       Infectious Disease:       Malignancy:       Other:  - neg other ROS          Physical Exam    Airway        Mallampati: II   TM distance: > 3 FB   Neck ROM: full   Mouth opening: > 3 cm    Respiratory Devices and Support         Dental       (+) Minor Abnormalities - some fillings, tiny chips      Cardiovascular          Rhythm and rate: regular and normal     Pulmonary           breath sounds clear to auscultation           OUTSIDE LABS:  CBC:   Lab Results   Component Value Date    WBC 6.0 05/01/2024    WBC 8.0 04/17/2020    HGB 15.2 05/01/2024    HGB 16.3 04/17/2020    HCT 44.5 05/01/2024    HCT 47.7 04/17/2020     05/01/2024     04/17/2020     BMP:   Lab Results   Component Value Date     05/01/2024      "04/17/2020    POTASSIUM 5.1 05/01/2024    POTASSIUM 4.0 04/17/2020    CHLORIDE 102 05/01/2024    CHLORIDE 105 04/17/2020    CO2 24 05/01/2024    CO2 29 04/17/2020    BUN 15.3 05/01/2024    BUN 15 04/17/2020    CR 1.19 (H) 05/01/2024    CR 0.95 04/17/2020    GLC  05/01/2024      Comment:      Lab accident. Glucose specimen stability exceeded. Disregard results  This is a corrected result. Previous result was 89 mg/dL on 5/2/2024 at  1:50 PM CDT    GLC 96 04/17/2020     COAGS: No results found for: \"PTT\", \"INR\", \"FIBR\"  POC: No results found for: \"BGM\", \"HCG\", \"HCGS\"  HEPATIC:   Lab Results   Component Value Date    ALBUMIN 4.8 05/01/2024    PROTTOTAL 7.7 05/01/2024    ALT 50 05/01/2024    AST 44 05/01/2024    ALKPHOS 65 05/01/2024    BILITOTAL 0.8 05/01/2024     OTHER:   Lab Results   Component Value Date    DAVID 9.9 05/01/2024    LIPASE 38 05/01/2024    TSH 2.51 05/05/2022       Anesthesia Plan    ASA Status:  3    NPO Status:  NPO Appropriate    Anesthesia Type: MAC.     - Reason for MAC: straight local not clinically adequate   Induction: Intravenous.           Consents    Anesthesia Plan(s) and associated risks, benefits, and realistic alternatives discussed. Questions answered and patient/representative(s) expressed understanding.     - Discussed: Risks, Benefits and Alternatives for BOTH SEDATION and the PROCEDURE were discussed     - Discussed with:  Patient      - Extended Intubation/Ventilatory Support Discussed: No.      - Patient is DNR/DNI Status: No     Use of blood products discussed: No .     Postoperative Care            Comments:    Other Comments: Discussed the possibility of general anesthesia if unable to safely perform MAC anesthesia. Patient agreeable to plan.           Renetta Faye MD    I have reviewed the pertinent notes and labs in the chart from the past 30 days and (re)examined the patient.  Any updates or changes from those notes are reflected in this note.              # Overweight: " "Estimated body mass index is 28.7 kg/m  as calculated from the following:    Height as of 5/28/24: 1.778 m (5' 10\").    Weight as of 5/28/24: 90.7 kg (200 lb).      "

## 2024-06-10 NOTE — BRIEF OP NOTE
[facility]    Brief Operative Note    Pre-operative diagnosis: Pancreatic cyst [K86.2]  Post-operative diagnosis Same as pre-operative diagnosis    Procedure: ESOPHAGOGASTRODUODENOSCOPY, WITH FINE NEEDLE ASPIRATION BIOPSY, WITH ENDOSCOPIC ULTRASOUND GUIDANCE, N/A - Esophagus    Surgeon: Surgeons and Role:     * Guru Libby Reyes MD - Primary     * Aba Martel MD - Fellow - Assisting  Anesthesia: MAC   Estimated Blood Loss: None    Drains: None  Specimens:   ID Type Source Tests Collected by Time Destination   1 : Pancreatic Cyst Wall Fine Needle Aspiration Pancreas NON-GYNECOLOGIC CYTOLOGY Guru Libby Reyes MD 6/10/2024  1:21 PM    A : Pancreatic Cyst Aspirate Cyst Pancreas PANCREATIC CYST PANEL (INCLUDES CYTOLOGY), LABORATORY MISCELLANEOUS ORDER Guru Libby Reyes MD 6/10/2024  1:14 PM      Findings:   None.  Complications: None.  Implants: * No implants in log *    Findings:  - A 27 mm by 20 mm multicystic lesion was seen in the pancreatic head, appearance most consistent with a serous cystadenoma.  Fine needle aspiration for fluid performed yielding 0.5cc's which were sent for CEA analysis.  Fine needle biopsy performed via two passess with a 22 gauge Cook AccuCore biopsy needle using a transduodenal approach.     Recommendations:  - Observe patient in GI recovery unit for possible discharge same day.   - Await path results and await tumor markers.   - Complete a 5-day course of levaquin starting tomorrow.  Avoid physical exercise during this time given your history of achilles tendonitis.    - The findings and recommendations were discussed with the patient and their spouse.

## 2024-06-10 NOTE — ANESTHESIA POSTPROCEDURE EVALUATION
Patient: Edin Padilla    Procedure: Procedure(s):  ESOPHAGOGASTRODUODENOSCOPY, WITH FINE NEEDLE ASPIRATION BIOPSY, WITH ENDOSCOPIC ULTRASOUND GUIDANCE       Anesthesia Type:  MAC    Note:  Disposition: Outpatient   Postop Pain Control: Uneventful            Sign Out: Well controlled pain   PONV: No   Neuro/Psych: Uneventful            Sign Out: Acceptable/Baseline neuro status   Airway/Respiratory: Uneventful            Sign Out: Acceptable/Baseline resp. status   CV/Hemodynamics: Uneventful            Sign Out: Acceptable CV status; No obvious hypovolemia; No obvious fluid overload   Other NRE: NONE   DID A NON-ROUTINE EVENT OCCUR? No         Electronically Signed By: Renetta Faye MD  Anne 10, 2024  1:59 PM

## 2024-06-10 NOTE — ANESTHESIA CARE TRANSFER NOTE
Patient: Edin Padilla    Procedure: Procedure(s):  ESOPHAGOGASTRODUODENOSCOPY, WITH FINE NEEDLE ASPIRATION BIOPSY, WITH ENDOSCOPIC ULTRASOUND GUIDANCE       Diagnosis: Pancreatic cyst [K86.2]  Diagnosis Additional Information: No value filed.    Anesthesia Type:   MAC     Note:    Oropharynx: oropharynx clear of all foreign objects and spontaneously breathing  Level of Consciousness: awake  Oxygen Supplementation: room air    Independent Airway: airway patency satisfactory and stable  Dentition: dentition unchanged  Vital Signs Stable: post-procedure vital signs reviewed and stable  Report to RN Given: handoff report given  Patient transferred to: Phase II    Handoff Report: Identifed the Patient, Identified the Reponsible Provider, Reviewed the pertinent medical history, Discussed the surgical course, Reviewed Intra-OP anesthesia mangement and issues during anesthesia, Set expectations for post-procedure period and Allowed opportunity for questions and acknowledgement of understanding      Vitals:  Vitals Value Taken Time   BP     Temp     Pulse     Resp     SpO2         Electronically Signed By: MANDY Cano CRNA  Anne 10, 2024  1:50 PM

## 2024-06-11 NOTE — RESULT ENCOUNTER NOTE
The results of the EUS guided fine-needle aspiration and morphological appearance of the cyst is consistent with serous cystadenoma.  Serous cystadenoma carries very very minimal risk of malignant transformation.  Hence the cyst do not require further surveillance.  I will therefore recommend no further MRIs.  We will wait for the final cytology results as well.  The right adrenal nodule could be better studied by an MRI which could be ordered by the referring provider.

## 2024-06-13 LAB
PATH REPORT.COMMENTS IMP SPEC: NORMAL
PATH REPORT.FINAL DX SPEC: NORMAL
PATH REPORT.GROSS SPEC: NORMAL
PATH REPORT.MICROSCOPIC SPEC OTHER STN: NORMAL
PATH REPORT.RELEVANT HX SPEC: NORMAL

## 2024-06-17 ENCOUNTER — PATIENT OUTREACH (OUTPATIENT)
Dept: GASTROENTEROLOGY | Facility: CLINIC | Age: 64
End: 2024-06-17
Payer: COMMERCIAL

## 2024-06-17 NOTE — PROGRESS NOTES
Post Upper EUS on 6/10/24 with Dr. Reyes.      Follow-up recommendations:    - Await path results and await tumor markers.   - Complete a 5-day course of levaquin starting tomorrow. Avoid physical exercise during this time given your history of achilles tendonitis.     Following review of pathology, per Dr. Reyes:   The results of the EUS guided fine-needle aspiration and morphological appearance of the cyst is consistent with serous cystadenoma.  Serous cystadenoma carries very very minimal risk of malignant transformation.  Hence the cyst do not require further surveillance.  I will therefore recommend no further MRIs. We will wait for the final cytology results as well. The right adrenal nodule could be better studied by an MRI which could be ordered by the referring provider.     Patient states: Patient is feeling well post procedure. No symptoms to report. Read patient Dr. Reyes's interpretation of results as above as [facility] Patient grateful for the communication. Articulates that he will discuss adrenal nodule with his PCP.     Orders placed: None indicated.     Reviewed post procedure recommendations and discussed symptoms to report to our clinic. Patient articulated understanding.     Clinic contact and scheduling numbers verified for future questions/concerns.     Leslie Owens, RN Care Coordinator

## 2024-06-18 LAB
PATH REPORT.COMMENTS IMP SPEC: NORMAL
PATH REPORT.COMMENTS IMP SPEC: NORMAL
PATH REPORT.FINAL DX SPEC: NORMAL
PATH REPORT.GROSS SPEC: NORMAL
PATH REPORT.MICROSCOPIC SPEC OTHER STN: NORMAL
PATH REPORT.RELEVANT HX SPEC: NORMAL

## 2024-06-18 NOTE — RESULT ENCOUNTER NOTE
The results of the EUS guided fine needle biopsy were consistent with serous cystadenoma. Since there is minimal risk for malignant transformation, will need no further surveillance

## 2024-10-10 ENCOUNTER — OFFICE VISIT (OUTPATIENT)
Dept: OPTOMETRY | Facility: CLINIC | Age: 64
End: 2024-10-10
Payer: COMMERCIAL

## 2024-10-10 DIAGNOSIS — D31.32 CHOROIDAL NEVUS OF LEFT EYE: ICD-10-CM

## 2024-10-10 DIAGNOSIS — H10.13 ALLERGIC CONJUNCTIVITIS, BILATERAL: Primary | ICD-10-CM

## 2024-10-10 DIAGNOSIS — H52.4 PRESBYOPIA: ICD-10-CM

## 2024-10-10 PROCEDURE — 92015 DETERMINE REFRACTIVE STATE: CPT | Performed by: OPTOMETRIST

## 2024-10-10 PROCEDURE — 92014 COMPRE OPH EXAM EST PT 1/>: CPT | Performed by: OPTOMETRIST

## 2024-10-10 RX ORDER — NEOMYCIN SULFATE, POLYMYXIN B SULFATE AND DEXAMETHASONE 3.5; 10000; 1 MG/ML; [USP'U]/ML; MG/ML
1 SUSPENSION/ DROPS OPHTHALMIC EVERY 4 HOURS
Qty: 5 ML | Refills: 0 | Status: SHIPPED | OUTPATIENT
Start: 2024-10-10 | End: 2024-10-17

## 2024-10-10 ASSESSMENT — CONF VISUAL FIELD
OD_INFERIOR_NASAL_RESTRICTION: 0
OS_NORMAL: 1
OD_NORMAL: 1
OS_SUPERIOR_TEMPORAL_RESTRICTION: 0
OS_SUPERIOR_NASAL_RESTRICTION: 0
OD_INFERIOR_TEMPORAL_RESTRICTION: 0
METHOD: COUNTING FINGERS
OS_INFERIOR_NASAL_RESTRICTION: 0
OD_SUPERIOR_TEMPORAL_RESTRICTION: 0
OD_SUPERIOR_NASAL_RESTRICTION: 0
OS_INFERIOR_TEMPORAL_RESTRICTION: 0

## 2024-10-10 ASSESSMENT — CUP TO DISC RATIO
OD_RATIO: 0.3
OS_RATIO: 0.2

## 2024-10-10 ASSESSMENT — REFRACTION_MANIFEST
OS_ADD: +2.25
OD_SPHERE: +2.75
OD_ADD: +2.25
OS_CYLINDER: +0.50
OD_CYLINDER: SPHERE
OS_SPHERE: +2.25
OS_CYLINDER: SPHERE
OD_SPHERE: +2.75
OS_AXIS: 157
METHOD_AUTOREFRACTION: 1
OS_SPHERE: +2.00

## 2024-10-10 ASSESSMENT — KERATOMETRY
OD_AXISANGLE2_DEGREES: 174
OS_AXISANGLE2_DEGREES: 19
OD_K2POWER_DIOPTERS: 43.00
OD_K1POWER_DIOPTERS: 42.25
OS_K1POWER_DIOPTERS: 42.00
OS_K2POWER_DIOPTERS: 42.50

## 2024-10-10 ASSESSMENT — SLIT LAMP EXAM - LIDS
COMMENTS: 2+ MEIBOMIAN GLAND DYSFUNCTION
COMMENTS: 2+ MEIBOMIAN GLAND DYSFUNCTION

## 2024-10-10 ASSESSMENT — VISUAL ACUITY
OD_CC: 20/25
CORRECTION_TYPE: GLASSES
OS_CC: 20/25
OD_CC+: +1
OD_CC: 20/25-1
METHOD: SNELLEN - LINEAR
OS_CC: 20/20

## 2024-10-10 ASSESSMENT — REFRACTION_WEARINGRX
SPECS_TYPE: PAL
OD_ADD: +2.00
OS_SPHERE: +1.75
OD_SPHERE: +2.00
OS_CYLINDER: SPHERE
OS_ADD: +2.00
OD_CYLINDER: SPHERE

## 2024-10-10 ASSESSMENT — EXTERNAL EXAM - LEFT EYE: OS_EXAM: NORMAL

## 2024-10-10 ASSESSMENT — TONOMETRY
OS_IOP_MMHG: 20
IOP_METHOD: APPLANATION
OD_IOP_MMHG: 20

## 2024-10-10 ASSESSMENT — EXTERNAL EXAM - RIGHT EYE: OD_EXAM: NORMAL

## 2024-10-10 NOTE — LETTER
10/10/2024      Edin Padilla  [address] [address] [address]      Dear Colleague,    Thank you for referring your patient, Edin Padilla, to the [facility]. Please see a copy of my visit note below.    Chief Complaint   Patient presents with     COMPREHENSIVE EYE EXAM         Last Eye Exam: 3/8/22  Dilated Previously: Yes    What are you currently using to see?  glasses       Distance Vision Acuity: Noticed gradual change in both eyes    Near Vision Acuity: Not satisfied, changes to vision in general     Eye Comfort: good usually, and he said that at least once a month he gets a red eye. Currently it is in his left eye but he said that it isn't always in that eye.  Left eye started today upon waking - lasts a month with no spring fall  harsh/ [place]b in [place] - no computer time   Do you use eye drops? 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       OBJECTIVE: See Ophthalmology exam    ASSESSMENT:    ICD-10-CM    1. Allergic conjunctivitis, bilateral - Both Eyes  H10.13       2. 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 OZ -Available at [facility]  Use twice daily.  Spray cotton swab or cotton pad 3 times, rub gently 3 times along upper and then 3 times along lower lashes and then both lids. Use new applicator for other eye.    Use over the counter artificial tears 2 times a day (Refresh Relieva ), Systane Balance  (white liquid) or Systane Complete ).  These are oil based.    Refer for OCT of nevus in left eye Tee Petty O.D.  [facility]  [address] [place] [address] [phone]                Again, thank you for allowing me to participate in the care of your patient.        Sincerely,        Kati Petty OD

## 2024-10-10 NOTE — PATIENT INSTRUCTIONS
OCuSOFT HypoChlor 0.02% Eyelid and Eyelash Spray 2 fl. OZ -Available at [facility]  Use twice daily.  Spray cotton swab or cotton pad 3 times, rub gently 3 times along upper and then 3 times along lower lashes and then both lids. Use new applicator for other eye.    Use over the counter artificial tears 2 times a day (Refresh Relieva ), Systane Balance  (white liquid) or Systane Complete ).  These are oil based.    Refer for OCT of nevus in left eye Tee Petty O.D.  [facility]  [address] [place] [address] [phone]

## 2024-10-10 NOTE — PROGRESS NOTES
Chief Complaint   Patient presents with    COMPREHENSIVE EYE EXAM         Last Eye Exam: 3/8/22  Dilated Previously: Yes    What are you currently using to see?  glasses       Distance Vision Acuity: Noticed gradual change in both eyes    Near Vision Acuity: Not satisfied, changes to vision in general     Eye Comfort: good usually, and he said that at least once a month he gets a red eye. Currently it is in his left eye but he said that it isn't always in that eye.  Left eye started today upon waking - lasts a month with no spring fall  jan/ feb in [place] - no computer time   Do you use eye drops? 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       OBJECTIVE: See Ophthalmology exam    ASSESSMENT:    ICD-10-CM    1. Allergic conjunctivitis, bilateral - Both Eyes  H10.13       2. 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 OZ -Available at [facility]  Use twice daily.  Spray cotton swab or cotton pad 3 times, rub gently 3 times along upper and then 3 times along lower lashes and then both lids. Use new applicator for other eye.    Use over the counter artificial tears 2 times a day (Refresh Relieva ), Systane Balance  (white liquid) or Systane Complete ).  These are oil based.    Refer for OCT of nevus in left eye Tee Petty O.D.  [facility]  [address] [place] [address] [phone]

## 2024-11-26 ENCOUNTER — OFFICE VISIT (OUTPATIENT)
Dept: OPTOMETRY | Facility: CLINIC | Age: 64
End: 2024-11-26
Payer: COMMERCIAL

## 2024-11-26 DIAGNOSIS — H10.13 ALLERGIC CONJUNCTIVITIS, BILATERAL: Primary | ICD-10-CM

## 2024-11-26 DIAGNOSIS — H02.9 LESION OF LEFT EYELID: ICD-10-CM

## 2024-11-26 PROCEDURE — 99213 OFFICE O/P EST LOW 20 MIN: CPT | Performed by: OPTOMETRIST

## 2024-11-26 RX ORDER — OLOPATADINE HYDROCHLORIDE 1 MG/ML
1 SOLUTION/ DROPS OPHTHALMIC 2 TIMES DAILY
Qty: 5 ML | Refills: 11 | Status: SHIPPED | OUTPATIENT
Start: 2024-11-26

## 2024-11-26 ASSESSMENT — VISUAL ACUITY
OS_CC+: -2
OD_CC+: -2
CORRECTION_TYPE: GLASSES
OS_CC: 20/20
METHOD: SNELLEN - LINEAR
OD_CC: 20/20

## 2024-11-26 ASSESSMENT — EXTERNAL EXAM - LEFT EYE: OS_EXAM: NORMAL

## 2024-11-26 ASSESSMENT — SLIT LAMP EXAM - LIDS: COMMENTS: 2+ MEIBOMIAN GLAND DYSFUNCTION

## 2024-11-26 ASSESSMENT — EXTERNAL EXAM - RIGHT EYE: OD_EXAM: NORMAL

## 2024-11-26 NOTE — PATIENT INSTRUCTIONS
You have allergies that are affecting your eyes.  This can cause itching and tearing of the eyes.  I recommend allergy drops to be used daily for 44 weeks.  Then you can then use it when your eyes are bothering you or if there are certain times of the year when you know you will be affected.  Cold compresses can also make things more comfortable.  Try not to rub the eyes.  I recommend that you see your primary care doctor or an allergist if you have other symptoms such as a runny nose or sneezing which has not been evaluated before or if your current medications don't seem to be helping.    Adrienne Esteban O.D.  [address] [address] [phone]

## 2024-11-26 NOTE — PROGRESS NOTES
Chief Complaint   Patient presents with    Eye Problem Left Eye     Woke up in the middle of the night and it felt like there was something in it as well as watery eyes along with making nose run. Tried putting in refresh eye drops and there was no improvement. Slight change in vision.                Emmett García - Optometric Assistant     See Review Of Systems       Medical, surgical and family histories reviewed and updated 11/26/2024.         OBJECTIVE: See Ophthalmology exam    ASSESSMENT:    ICD-10-CM    1. Allergic conjunctivitis, bilateral  H10.13          PLAN:    Patient Instructions   You have allergies that are affecting your eyes.  This can cause itching and tearing of the eyes.  I recommend allergy drops to be used daily for 44 weeks.  Then you can then use it when your eyes are bothering you or if there are certain times of the year when you know you will be affected.  Cold compresses can also make things more comfortable.  Try not to rub the eyes.  I recommend that you see your primary care doctor or an allergist if you have other symptoms such as a runny nose or sneezing which has not been evaluated before or if your current medications don't seem to be helping.    Adrienne Esteban O.D.  [address] [address]    [phone]

## 2024-11-26 NOTE — LETTER
11/26/2024      Edin Padilla  [id number] [facility] [address]      Dear Colleague,    Thank you for referring your patient, Edin Padilla, to the [facility]. Please see a copy of my visit note below.    Chief Complaint   Patient presents with     Eye Problem Left Eye     Woke up in the middle of the night and it felt like there was something in it as well as watery eyes along with making nose run. Tried putting in refresh eye drops and there was no improvement. Slight change in vision.                Emmett García - Optometric Assistant     See Review Of Systems       Medical, surgical and family histories reviewed and updated 11/26/2024.         OBJECTIVE: See Ophthalmology exam    ASSESSMENT:    ICD-10-CM    1. Allergic conjunctivitis, bilateral  H10.13          PLAN:    Patient Instructions   You have allergies that are affecting your eyes.  This can cause itching and tearing of the eyes.  I recommend allergy drops to be used daily for 44 weeks.  Then you can then use it when your eyes are bothering you or if there are certain times of the year when you know you will be affected.  Cold compresses can also make things more comfortable.  Try not to rub the eyes.  I recommend that you see your primary care doctor or an allergist if you have other symptoms such as a runny nose or sneezing which has not been evaluated before or if your current medications don't seem to be helping.    Adrienne Esteban O.D.  [facility]   [address] [place] [address]    [phone]           Again, thank you for allowing me to participate in the care of your patient.        Sincerely,        Adrienne Esteban OD

## 2025-01-15 ENCOUNTER — NURSE TRIAGE (OUTPATIENT)
Dept: FAMILY MEDICINE | Facility: CLINIC | Age: 65
End: 2025-01-15
Payer: COMMERCIAL

## 2025-01-15 NOTE — TELEPHONE ENCOUNTER
"Team    Please help patient schedule a visit for abdominal symptoms.   It would be ok to schedule next week sometime if nothing open this week     Per protocol\"    See in Office Today or Tomorrow                     Prompt Disposition/Alternative Protocol     MILD pain (e.g., does not interfere with normal activities) and pain comes and goes (cramps) lasts > 48 hours  (Exception: This same abdominal pain is a chronic symptom recurrent or ongoing AND present > 4 weeks.)       Thanks,  NARENDRA Parker  [facility]     "

## 2025-01-16 NOTE — TELEPHONE ENCOUNTER
Scheduled for 1/22        Reason for Disposition   Abdominal pain is a chronic symptom (recurrent or ongoing AND lasting > 4 weeks)    Additional Information   Negative: Passed out (e.g., fainted, lost consciousness, blacked out and was not responding)   Negative: Shock suspected (e.g., cold/pale/clammy skin, too weak to stand, low BP, rapid pulse)   Negative: Sounds like a life-threatening emergency to the triager   Negative: Followed an abdomen (stomach) injury   Negative: Chest pain   Negative: Pain is mainly in upper abdomen (if needed ask: 'is it mainly above the belly button?')   Negative: Abdomen bloating or swelling are main symptoms   Negative: SEVERE abdominal pain (e.g., excruciating)   Negative: Vomiting red blood or black (coffee ground) material   Negative: Blood in bowel movements  (Exception: Blood on surface of BM with constipation.)   Negative: Black or tarry bowel movements  (Exception: Chronic-unchanged black-grey BMs AND is taking iron pills or Pepto-Bismol.)   Negative: Unable to urinate (or only a few drops) and bladder feels very full   Negative: Pain in scrotum persists > 1 hour   Negative: MILD TO MODERATE constant pain lasting > 2 hours   Negative: MILD TO MODERATE constant pain lasting > 2 hours, and age > 60 years   Negative: Vomiting bile (green color)   Negative: Patient sounds very sick or weak to the triager   Negative: Vomiting and abdomen looks much more swollen than usual   Negative: White of the eyes have turned yellow (i.e., jaundice)   Negative: Blood in urine (red, pink, or tea-colored)   Negative: Fever > 103 F (39.4 C)   Negative: Fever > 101 F (38.3 C) and over 60 years of age   Negative: Fever > 100 F (37.8 C) and has diabetes mellitus or a weak immune system (e.g., HIV positive, cancer chemotherapy, organ transplant, splenectomy, chronic steroids)   Negative: Fever > 100 F (37.8 C) and bedridden (e.g., CVA, chronic illness, recovering from surgery)   Negative: MODERATE  "pain (e.g., interferes with normal activities) that comes and goes (cramps) lasts > 24 hours  (Exception: Pain with Vomiting or Diarrhea - see that Protocol.)   Negative: Patient wants to be seen   Negative: MILD pain (e.g., does not interfere with normal activities) and pain comes and goes (cramps) lasts > 48 hours  (Exception: This same abdominal pain is a chronic symptom recurrent or ongoing AND present > 4 weeks.)    Answer Assessment - Initial Assessment Questions  1. LOCATION: \"Where does it hurt?\"       Lower abdomen   2. RADIATION: \"Does the pain shoot anywhere else?\" (e.g., chest, back)      Moves from right side to left side  3. ONSET: \"When did the pain begin?\" (Minutes, hours or days ago)       Couple of months  4. SUDDEN: \"Gradual or sudden onset?\"      gradual  5. PATTERN \"Does the pain come and go, or is it constant?\"      Mild pain- comes and goes  6. SEVERITY: \"How bad is the pain?\"  (e.g., Scale 1-10; mild, moderate, or severe)      mild  7. RECURRENT SYMPTOM: \"Have you ever had this type of stomach pain before?\" If Yes, ask: \"When was the last time?\" and \"What happened that time?\"       Not ever experienced before  8. CAUSE: \"What do you think is causing the stomach pain?\"      Unsure   9. RELIEVING/AGGRAVATING FACTORS: \"What makes it better or worse?\" (e.g., antacids, bending or twisting motion, bowel movement)      Exercise makes it feel better   10. OTHER SYMPTOMS: \"Do you have any other symptoms?\" (e.g., back pain, diarrhea, fever, urination pain, vomiting)        Very soft stool BID    Protocols used: Abdominal Pain - Leigh-CAS Chavez RN    Triage Nurse  [facility]      "

## 2025-01-16 NOTE — TELEPHONE ENCOUNTER
Called patient to triage symptoms further. Left voice message to return call at [phone].     Janel Doyle RN

## 2025-01-22 ENCOUNTER — OFFICE VISIT (OUTPATIENT)
Dept: FAMILY MEDICINE | Facility: CLINIC | Age: 65
End: 2025-01-22
Payer: COMMERCIAL

## 2025-01-22 ENCOUNTER — MYC MEDICAL ADVICE (OUTPATIENT)
Dept: FAMILY MEDICINE | Facility: CLINIC | Age: 65
End: 2025-01-22

## 2025-01-22 VITALS
DIASTOLIC BLOOD PRESSURE: 93 MMHG | HEART RATE: 67 BPM | OXYGEN SATURATION: 99 % | SYSTOLIC BLOOD PRESSURE: 148 MMHG | RESPIRATION RATE: 16 BRPM | TEMPERATURE: 97.3 F | BODY MASS INDEX: 29.63 KG/M2 | HEIGHT: 70 IN | WEIGHT: 207 LBS

## 2025-01-22 DIAGNOSIS — Z12.5 SCREENING FOR PROSTATE CANCER: Primary | ICD-10-CM

## 2025-01-22 DIAGNOSIS — R10.31 RLQ ABDOMINAL PAIN: Primary | ICD-10-CM

## 2025-01-22 LAB
ALBUMIN UR-MCNC: NEGATIVE MG/DL
APPEARANCE UR: CLEAR
BASOPHILS # BLD AUTO: 0 10E3/UL (ref 0–0.2)
BASOPHILS NFR BLD AUTO: 1 %
BILIRUB UR QL STRIP: NEGATIVE
COLOR UR AUTO: YELLOW
EOSINOPHIL # BLD AUTO: 0.2 10E3/UL (ref 0–0.7)
EOSINOPHIL NFR BLD AUTO: 2 %
ERYTHROCYTE [DISTWIDTH] IN BLOOD BY AUTOMATED COUNT: 12.2 % (ref 10–15)
GLUCOSE UR STRIP-MCNC: NEGATIVE MG/DL
HCT VFR BLD AUTO: 46.4 % (ref 40–53)
HGB BLD-MCNC: 15.9 G/DL (ref 13.3–17.7)
HGB UR QL STRIP: ABNORMAL
IMM GRANULOCYTES # BLD: 0 10E3/UL
IMM GRANULOCYTES NFR BLD: 1 %
KETONES UR STRIP-MCNC: NEGATIVE MG/DL
LEUKOCYTE ESTERASE UR QL STRIP: NEGATIVE
LYMPHOCYTES # BLD AUTO: 2 10E3/UL (ref 0.8–5.3)
LYMPHOCYTES NFR BLD AUTO: 30 %
MCH RBC QN AUTO: 29.1 PG (ref 26.5–33)
MCHC RBC AUTO-ENTMCNC: 34.3 G/DL (ref 31.5–36.5)
MCV RBC AUTO: 85 FL (ref 78–100)
MONOCYTES # BLD AUTO: 0.5 10E3/UL (ref 0–1.3)
MONOCYTES NFR BLD AUTO: 7 %
NEUTROPHILS # BLD AUTO: 3.9 10E3/UL (ref 1.6–8.3)
NEUTROPHILS NFR BLD AUTO: 60 %
NITRATE UR QL: NEGATIVE
PH UR STRIP: 6 [PH] (ref 5–7)
PLATELET # BLD AUTO: 226 10E3/UL (ref 150–450)
RBC # BLD AUTO: 5.46 10E6/UL (ref 4.4–5.9)
RBC #/AREA URNS AUTO: NORMAL /HPF
SP GR UR STRIP: <=1.005 (ref 1–1.03)
UROBILINOGEN UR STRIP-ACNC: 0.2 E.U./DL
WBC # BLD AUTO: 6.6 10E3/UL (ref 4–11)
WBC #/AREA URNS AUTO: NORMAL /HPF

## 2025-01-22 PROCEDURE — 81001 URINALYSIS AUTO W/SCOPE: CPT | Performed by: FAMILY MEDICINE

## 2025-01-22 PROCEDURE — 85025 COMPLETE CBC W/AUTO DIFF WBC: CPT | Performed by: FAMILY MEDICINE

## 2025-01-22 PROCEDURE — 80053 COMPREHEN METABOLIC PANEL: CPT | Performed by: FAMILY MEDICINE

## 2025-01-22 PROCEDURE — 36415 COLL VENOUS BLD VENIPUNCTURE: CPT | Performed by: FAMILY MEDICINE

## 2025-01-22 PROCEDURE — 99214 OFFICE O/P EST MOD 30 MIN: CPT | Performed by: FAMILY MEDICINE

## 2025-01-22 NOTE — PROGRESS NOTES
"  Assessment & Plan   Problem List Items Addressed This Visit    None  Visit Diagnoses       RLQ abdominal pain    -  Primary    Relevant Orders    CT Abdomen Pelvis w Contrast    Comprehensive metabolic panel (BMP + Alb, Alk Phos, ALT, AST, Total. Bili, TP)    CBC with platelets and differential    UA Macroscopic with reflex to Microscopic and Culture - Lab Collect           See orders for workup  Going to [facility] on Saturday, told to go to ED if pain worsens     BMI  Estimated body mass index is 29.7 kg/m  as calculated from the following:    Height as of this encounter: 1.778 m (5' 10\").    Weight as of this encounter: 93.9 kg (207 lb).       Subjective   Sharath is a 64 year old, presenting for the following health issues:  Gastrointestinal Problem (Mild Abdominal Pain with Loose stools x2 months)        1/22/2025    12:09 PM   Additional Questions   Roomed by Lorene HOPPER CMA     History of Present Illness       Reason for visit:  Lower abdomen pain  Symptom onset:  More than a month  Symptoms include:  Mild pain  Symptom intensity:  Mild  Symptom progression:  Worsening  Had these symptoms before:  No  What makes it worse:  Undetermined  What makes it better:  Excersizing   He is taking medications regularly.           Objective    BP (!) 148/93 (BP Location: Left arm, Patient Position: Chair, Cuff Size: Adult Regular)   Pulse 67   Temp 97.3  F (36.3  C) (Temporal)   Resp 16   Ht 1.778 m (5' 10\")   Wt 93.9 kg (207 lb)   SpO2 99%   BMI 29.70 kg/m    Body mass index is 29.7 kg/m .  Physical Exam   Gen NAD  RLQ mild pain, no rebound  Left inguinal hernia equivocal    Non toxic        Signed Electronically by: RADHA JJ DO    "

## 2025-01-23 LAB
ALBUMIN SERPL BCG-MCNC: 4.9 G/DL (ref 3.5–5.2)
ALP SERPL-CCNC: 77 U/L (ref 40–150)
ALT SERPL W P-5'-P-CCNC: 70 U/L (ref 0–70)
ANION GAP SERPL CALCULATED.3IONS-SCNC: 14 MMOL/L (ref 7–15)
AST SERPL W P-5'-P-CCNC: 49 U/L (ref 0–45)
BILIRUB SERPL-MCNC: 0.7 MG/DL
BUN SERPL-MCNC: 12.9 MG/DL (ref 8–23)
CALCIUM SERPL-MCNC: 9.7 MG/DL (ref 8.8–10.4)
CHLORIDE SERPL-SCNC: 102 MMOL/L (ref 98–107)
CREAT SERPL-MCNC: 1.02 MG/DL (ref 0.67–1.17)
EGFRCR SERPLBLD CKD-EPI 2021: 82 ML/MIN/1.73M2
GLUCOSE SERPL-MCNC: 93 MG/DL (ref 70–99)
HCO3 SERPL-SCNC: 24 MMOL/L (ref 22–29)
POTASSIUM SERPL-SCNC: 4.8 MMOL/L (ref 3.4–5.3)
PROT SERPL-MCNC: 8 G/DL (ref 6.4–8.3)
SODIUM SERPL-SCNC: 140 MMOL/L (ref 135–145)

## 2025-02-11 ENCOUNTER — ANCILLARY PROCEDURE (OUTPATIENT)
Dept: CT IMAGING | Facility: CLINIC | Age: 65
End: 2025-02-11
Attending: FAMILY MEDICINE
Payer: COMMERCIAL

## 2025-02-11 DIAGNOSIS — R10.31 RLQ ABDOMINAL PAIN: ICD-10-CM

## 2025-02-11 PROCEDURE — 74177 CT ABD & PELVIS W/CONTRAST: CPT | Mod: TC | Performed by: RADIOLOGY

## 2025-02-11 RX ORDER — IOPAMIDOL 755 MG/ML
127 INJECTION, SOLUTION INTRAVASCULAR ONCE
Status: COMPLETED | OUTPATIENT
Start: 2025-02-11 | End: 2025-02-11

## 2025-02-11 RX ADMIN — IOPAMIDOL 127 ML: 755 INJECTION, SOLUTION INTRAVASCULAR at 08:41

## 2025-02-12 ENCOUNTER — MYC MEDICAL ADVICE (OUTPATIENT)
Dept: FAMILY MEDICINE | Facility: CLINIC | Age: 65
End: 2025-02-12
Payer: COMMERCIAL

## 2025-02-12 DIAGNOSIS — Z12.5 SCREENING FOR PROSTATE CANCER: ICD-10-CM

## 2025-02-12 DIAGNOSIS — R31.29 OTHER MICROSCOPIC HEMATURIA: Primary | ICD-10-CM

## 2025-02-27 ENCOUNTER — LAB (OUTPATIENT)
Dept: LAB | Facility: CLINIC | Age: 65
End: 2025-02-27
Payer: COMMERCIAL

## 2025-02-27 DIAGNOSIS — R31.29 OTHER MICROSCOPIC HEMATURIA: ICD-10-CM

## 2025-02-27 LAB
ALBUMIN UR-MCNC: NEGATIVE MG/DL
APPEARANCE UR: CLEAR
BACTERIA #/AREA URNS HPF: ABNORMAL /HPF
BILIRUB UR QL STRIP: NEGATIVE
COLOR UR AUTO: YELLOW
GLUCOSE UR STRIP-MCNC: NEGATIVE MG/DL
HGB UR QL STRIP: ABNORMAL
KETONES UR STRIP-MCNC: NEGATIVE MG/DL
LEUKOCYTE ESTERASE UR QL STRIP: NEGATIVE
NITRATE UR QL: NEGATIVE
PH UR STRIP: 6 [PH] (ref 5–7)
RBC #/AREA URNS AUTO: ABNORMAL /HPF
SP GR UR STRIP: <=1.005 (ref 1–1.03)
SQUAMOUS #/AREA URNS AUTO: ABNORMAL /LPF
UROBILINOGEN UR STRIP-ACNC: 0.2 E.U./DL
WBC #/AREA URNS AUTO: ABNORMAL /HPF

## 2025-03-05 ENCOUNTER — OFFICE VISIT (OUTPATIENT)
Dept: FAMILY MEDICINE | Facility: CLINIC | Age: 65
End: 2025-03-05
Payer: COMMERCIAL

## 2025-03-05 ENCOUNTER — ANCILLARY PROCEDURE (OUTPATIENT)
Dept: GENERAL RADIOLOGY | Facility: CLINIC | Age: 65
End: 2025-03-05
Attending: FAMILY MEDICINE
Payer: COMMERCIAL

## 2025-03-05 VITALS
BODY MASS INDEX: 29.78 KG/M2 | DIASTOLIC BLOOD PRESSURE: 86 MMHG | OXYGEN SATURATION: 99 % | WEIGHT: 208 LBS | HEART RATE: 70 BPM | TEMPERATURE: 98 F | RESPIRATION RATE: 17 BRPM | HEIGHT: 70 IN | SYSTOLIC BLOOD PRESSURE: 128 MMHG

## 2025-03-05 DIAGNOSIS — G89.29 CHRONIC RIGHT SHOULDER PAIN: ICD-10-CM

## 2025-03-05 DIAGNOSIS — M25.511 CHRONIC RIGHT SHOULDER PAIN: ICD-10-CM

## 2025-03-05 DIAGNOSIS — I48.0 PAROXYSMAL ATRIAL FIBRILLATION (H): ICD-10-CM

## 2025-03-05 DIAGNOSIS — I48.0 PAROXYSMAL ATRIAL FIBRILLATION (H): Primary | ICD-10-CM

## 2025-03-05 DIAGNOSIS — Z86.79 HISTORY OF ATRIAL FIBRILLATION: Primary | ICD-10-CM

## 2025-03-05 DIAGNOSIS — Z86.79 HISTORY OF ATRIAL FIBRILLATION: ICD-10-CM

## 2025-03-05 PROCEDURE — 73030 X-RAY EXAM OF SHOULDER: CPT | Mod: TC | Performed by: STUDENT IN AN ORGANIZED HEALTH CARE EDUCATION/TRAINING PROGRAM

## 2025-03-05 RX ORDER — METOPROLOL SUCCINATE 25 MG/1
25 TABLET, EXTENDED RELEASE ORAL DAILY
Qty: 90 TABLET | Refills: 0 | Status: SHIPPED | OUTPATIENT
Start: 2025-03-05

## 2025-03-05 RX ORDER — METOPROLOL SUCCINATE 25 MG/1
25 TABLET, EXTENDED RELEASE ORAL DAILY
Qty: 30 TABLET | Refills: 0 | Status: SHIPPED | OUTPATIENT
Start: 2025-03-05 | End: 2025-03-05

## 2025-03-05 NOTE — PROGRESS NOTES
"    ICD-10-CM    1. History of atrial fibrillation  Z86.79 metoprolol succinate ER (TOPROL XL) 25 MG 24 hr tablet     Adult Cardiology Eval [facility] Referral      2. Paroxysmal atrial fibrillation (H)  I48.0 Echocardiogram Complete       PLAN:await referral      Subjective   Sharath is a 64 year old, presenting for the following health issues:  Shoulder Pain (RT Shoulder ), Atrial Fib, and Follow Up (Abdominal Pain/ Hematuria )        3/5/2025     7:27 AM   Additional Questions   Roomed by Venkata RUIZ LPN   Accompanied by Self         3/5/2025     7:27 AM   Patient Reported Additional Medications   Patient reports taking the following new medications None     History of Present Illness       Reason for visit:  Shoulder pain and A-fib     History of atrial fibrillation 6 times over 25 years. He notes rapid heart beat with rapid heart beat and requires cardiac conversion. No chest pain or shortness of breath.  He has had an echo but years ago.   OBJECTIVE:  no apparent distress  /86   Pulse 70   Temp 98  F (36.7  C) (Tympanic)   Resp 17   Ht 1.778 m (5' 10\")   Wt 94.3 kg (208 lb)   SpO2 99%   BMI 29.84 kg/m      LUNGS:  CTA B/L, no wheezing or crackles.   Cardiovascular: negative, PMI normal. No lifts, heaves, or thrills. RRR. No murmurs, clicks gallops or rub   SUBJECTIVE:  Edin Padilla is a 64 year old male  who is seen for  right shoulder pain that started   1 year ago. Worse at night.heavy work will bring the pain on    Relieving Factors:  Rest   Worsening Factors: worse at night  Symptoms have been sudden since that time.  Prior history of related problems: no prior problems with this area in the past.    Patients past medical, surgical, social and family histories reviewed.   RIGHT SHOULDER  Inspection: no swelling, bruising, discoloration, or obvious deformity or asymmetry  Tender: nontender  Range of Motion  Active:all normal    Pain on external rotation  "

## 2025-03-14 ENCOUNTER — ANCILLARY PROCEDURE (OUTPATIENT)
Dept: CARDIOLOGY | Facility: CLINIC | Age: 65
End: 2025-03-14
Attending: FAMILY MEDICINE
Payer: COMMERCIAL

## 2025-03-14 DIAGNOSIS — I48.0 PAROXYSMAL ATRIAL FIBRILLATION (H): ICD-10-CM

## 2025-03-14 LAB — LVEF ECHO: NORMAL

## 2025-03-14 PROCEDURE — 93306 TTE W/DOPPLER COMPLETE: CPT | Performed by: INTERNAL MEDICINE

## 2025-03-19 ENCOUNTER — OFFICE VISIT (OUTPATIENT)
Dept: OPHTHALMOLOGY | Facility: CLINIC | Age: 65
End: 2025-03-19
Payer: COMMERCIAL

## 2025-03-19 DIAGNOSIS — H10.13 ALLERGIC CONJUNCTIVITIS, BILATERAL: ICD-10-CM

## 2025-03-19 DIAGNOSIS — H02.9 LESION OF LEFT EYELID: ICD-10-CM

## 2025-03-19 ASSESSMENT — TONOMETRY
OS_IOP_MMHG: 16
OD_IOP_MMHG: 15
IOP_METHOD: ICARE

## 2025-03-19 ASSESSMENT — VISUAL ACUITY
CORRECTION_TYPE: GLASSES
OS_CC: 20/20
OD_CC: 20/20
OD_CC+: -1
METHOD: SNELLEN - LINEAR

## 2025-03-19 ASSESSMENT — SLIT LAMP EXAM - LIDS: COMMENTS: NORMAL

## 2025-03-19 NOTE — NURSING NOTE
Chief Complaints and History of Present Illnesses   Patient presents with    Lesion On Left Lower Lid       Chief Complaint(s) and History of Present Illness(es)       Lesion On Left Lower Lid              Laterality: left lower lid              Comments    Patient referred by Dr. Esteban for lesion evaluation. Pt states the lesion on left lower eyelid has been present for quite awhile, growing over time. Denies any discomfort/pain/discharge. Uses Pataday daily.                   Shari Hagen, COT

## 2025-03-19 NOTE — LETTER
3/19/2025         RE:  :  MRN: Edin Padilla  1960  [id number]     Dear Dr. Adrienne Esteban,    Thank you for asking me to see your patient, Edin Padilla, for an oculoplastic   consultation.  My assessment and plan are below.  For further details, please see my attached clinic note.      CC: Eyelid lesion    Chief Complaint(s) and History of Present Illness(es)     Lesion On Left Lower Lid            Laterality: left lower lid          Comments    Patient referred by Dr. Esteban for lesion evaluation. Pt states the   lesion on left lower eyelid has been present for quite awhile, growing   over time. Denies any discomfort/pain/discharge. Uses Pataday daily.                    HPI:     Edin Padilla is a 64 year old male who has noted a lesion on the left lower eyelid. It has been present for several years and has continued to grow. This has not been biopsied.    Enlarging: Yes  Irritating to eyelashes and catches in folds of skin: Yes moderate irritation  Bleeding: No  Prior cutaneous malignancy: No  Immunosuppression: No           Assessment and Plan:   1. left lower eyelid lesion - favor seborrheic keratosis of the left lower eyelid margin. It is enlarging over the past several months and starting to cause irritation. He would like it excised.     Cannot do it today due to another appointment he has but will return at his convenience for excisional biopsy.         Again, thank you for allowing me to participate in the care of your patient.      Sincerely,    Mara Baum MD  Department of Ophthalmology and Visual Neurosciences  [facility]    CC: [place]  [address]  [facility] [address]  Via In Basket

## 2025-03-19 NOTE — PROGRESS NOTES
Oculoplastic Clinic New Patient    Patient: Edin Padilla MRN# [id number]   YOB: 1960 Age: 64 year old   Date of Visit: Mar 19, 2025         CC: Eyelid lesion    Chief Complaint(s) and History of Present Illness(es)     Lesion On Left Lower Lid            Laterality: left lower lid          Comments    Patient referred by Dr. Esteban for lesion evaluation. Pt states the   lesion on left lower eyelid has been present for quite awhile, growing   over time. Denies any discomfort/pain/discharge. Uses Pataday daily.                    HPI:     Edin Padilla is a 64 year old male who has noted a lesion on the left lower eyelid. It has been present for several years and has continued to grow. This has not been biopsied.    Enlarging: Yes  Irritating to eyelashes and catches in folds of skin: Yes moderate irritation  Bleeding: No  Prior cutaneous malignancy: No  Immunosuppression: No           Assessment and Plan:   1. left lower eyelid lesion - favor seborrheic keratosis of the left lower eyelid margin. It is enlarging over the past several months and starting to cause irritation. He would like it excised.     Cannot do it today due to another appointment he has but will return at his convenience for excisional biopsy.          Attending Physician Attestation: Complete documentation of historical and exam elements from today's encounter can be found in the full encounter summary report (not reduplicated in this progress note). I personally obtained the chief complaint(s) and history of present illness. I confirmed and edited as necessary the review of systems, past medical/surgical history, family history, social history, and examination findings as documented by others; and I examined the patient myself. I personally reviewed the relevant tests, images, and reports as documented above. I formulated and edited as necessary the assessment and plan and discussed the findings and management plan with the  patient. Mara Baum MD    Today with Edin Padilla, I reviewed the indications, risks, benefits, and alternatives of the proposed surgical procedure including, but not limited to, failure obtain the desired result  and need for additional surgery, bleeding, infection, injury to the eye, loss of lashes, scar.  I provided multiple opportunities for the questions, answered all questions to the best of my ability, and confirmed that my answers and my discussion were understood. Mara Baum MD

## 2025-04-01 ENCOUNTER — PATIENT OUTREACH (OUTPATIENT)
Dept: CARE COORDINATION | Facility: CLINIC | Age: 65
End: 2025-04-01
Payer: COMMERCIAL

## 2025-04-01 ENCOUNTER — MYC MEDICAL ADVICE (OUTPATIENT)
Dept: FAMILY MEDICINE | Facility: CLINIC | Age: 65
End: 2025-04-01
Payer: COMMERCIAL

## 2025-04-01 DIAGNOSIS — R31.29 MICROSCOPIC HEMATURIA: ICD-10-CM

## 2025-04-01 DIAGNOSIS — Z80.9 FAMILY HISTORY OF CANCER IN FATHER: ICD-10-CM

## 2025-04-01 DIAGNOSIS — Z80.9: ICD-10-CM

## 2025-04-01 DIAGNOSIS — Z80.9 FAMILY HISTORY OF CANCER: Primary | ICD-10-CM

## 2025-04-01 DIAGNOSIS — R10.31 RLQ ABDOMINAL PAIN: ICD-10-CM

## 2025-04-07 ENCOUNTER — THERAPY VISIT (OUTPATIENT)
Dept: PHYSICAL THERAPY | Facility: CLINIC | Age: 65
End: 2025-04-07
Attending: FAMILY MEDICINE
Payer: COMMERCIAL

## 2025-04-07 DIAGNOSIS — S49.91XA SHOULDER INJURY, RIGHT, INITIAL ENCOUNTER: Primary | ICD-10-CM

## 2025-04-07 NOTE — PROGRESS NOTES
PHYSICAL THERAPY EVALUATION  Type of Visit: Evaluation              Subjective         Presenting condition or subjective complaint: Right shoulder pain  Order was put in incorrectly and thus he was scheduled incorrectly. NO charge today as pt was not evaluated. I did a quick screening of his shdr and all AROM looks great. Pec stretch bilateral looks good/feels fine.  He feels the pain anterior shdr/ped. Pain occurs at night when sleeping on LEFT shdr then right shdr hurts and has to reposition about 4 times a night. No problem sleeping on right shdr. I explained about seeing a shoulder specialist and helped him to get scheduled at [facility]/ortho PT clinic later this week.   Date of onset:      Relevant medical history:     Dates & types of surgery:      Prior diagnostic imaging/testing results: X-ray     Prior therapy history for the same diagnosis, illness or injury: No      Prior Level of Function  Transfers:   Ambulation:   ADL:   IADL:     Living Environment  Social support: With a significant other or spouse   Type of home: House   Stairs to enter the home: No       Ramp: No   Stairs inside the home: Yes 14 Is there a railing: Yes     Help at home: None  Equipment owned:       Employment: Yes   Hobbies/Interests: Outdoor activities    Patient goals for therapy: Resolve the issue causing the shoulder pain    Pain assessment:      Objective      Cognitive Status Examination  Orientation:    Level of Consciousness:   Follows Commands and Answers Questions:   Personal Safety and Judgement:   Memory:     OBSERVATION:   INTEGUMENTARY:   POSTURE:   PALPATION:   RANGE OF MOTION:   STRENGTH:   BED MOBILITY:   TRANSFERS:     GAIT:   Level of Weston:   Assistive Device(s):   Gait Deviations:   Gait Distance:   Stairs:     SPECIAL TESTS  Functional Gait Assessment (FGA)      10 Meter Walk Test (Comfortable)     10 Meter Walk Test (Fast)     6 Minute Walk Test (6MWT)            Cano Balance Scale (BBS)     5 Times  Sit-to-Stand (5TSTS)       Dynamic Gait Index (DGI)     Timed Up and Go (TUG) - sec    Single Leg Stance Right (sec)    Single Leg Stance Left (sec)    Modified CTSIB Conditions (sec) Cond 1:   Cond 2:   Cond 4:   Cond 5 :    Romberg  (sec)    Sharpened Romberg (sec)    30 Second Sit to Stand (reps/height)    Mini-BESTest              SENSATION:   REFLEXES:   COORDINATION:   MUSCLE TONE:         Assessment & Plan   CLINICAL IMPRESSIONS  Medical Diagnosis:      Treatment Diagnosis:     Impression/Assessment:     Clinical Decision Making (Complexity):  Clinical Presentation: Stable/Uncomplicated  Clinical Presentation Rationale: based on medical and personal factors listed in PT evaluation  Clinical Decision Making (Complexity): Low complexity    PLAN OF CARE  Treatment Interventions:  Ortho PT    Long Term Goals            Frequency of Treatment:    Duration of Treatment:      Recommended Referrals to Other Professionals:   Education Assessment:        Risks and benefits of evaluation/treatment have been explained.   Patient/Family/caregiver agrees with Plan of Care.     Evaluation Time:        Evaluation Only     Signing Clinician: Emily Velez, PT

## 2025-04-08 ENCOUNTER — OFFICE VISIT (OUTPATIENT)
Dept: UROLOGY | Facility: CLINIC | Age: 65
End: 2025-04-08
Payer: COMMERCIAL

## 2025-04-08 VITALS
DIASTOLIC BLOOD PRESSURE: 83 MMHG | BODY MASS INDEX: 29.78 KG/M2 | SYSTOLIC BLOOD PRESSURE: 163 MMHG | HEIGHT: 70 IN | WEIGHT: 208 LBS | OXYGEN SATURATION: 95 % | HEART RATE: 71 BPM

## 2025-04-08 DIAGNOSIS — R31.29 MICROSCOPIC HEMATURIA: Primary | ICD-10-CM

## 2025-04-08 DIAGNOSIS — R10.2 PELVIC PAIN IN MALE: ICD-10-CM

## 2025-04-08 DIAGNOSIS — N40.1 BENIGN PROSTATIC HYPERPLASIA WITH LOWER URINARY TRACT SYMPTOMS, SYMPTOM DETAILS UNSPECIFIED: ICD-10-CM

## 2025-04-08 PROCEDURE — 3079F DIAST BP 80-89 MM HG: CPT | Performed by: UROLOGY

## 2025-04-08 PROCEDURE — 3077F SYST BP >= 140 MM HG: CPT | Performed by: UROLOGY

## 2025-04-08 PROCEDURE — 1125F AMNT PAIN NOTED PAIN PRSNT: CPT | Performed by: UROLOGY

## 2025-04-08 PROCEDURE — 99203 OFFICE O/P NEW LOW 30 MIN: CPT | Performed by: UROLOGY

## 2025-04-08 RX ORDER — GUAIFENESIN AND DEXTROMETHORPHAN HYDROBROMIDE 600; 30 MG/1; MG/1
1 TABLET, EXTENDED RELEASE ORAL EVERY 12 HOURS
COMMUNITY

## 2025-04-08 ASSESSMENT — PAIN SCALES - GENERAL: PAINLEVEL_OUTOF10: MILD PAIN (2)

## 2025-04-08 NOTE — PROGRESS NOTES
Urology Note            S:  Edin Padilla is a 64 year old male who was seen in a consultation at the request of Dr. Mcgregor for hematuria.   Patient has microscopic hematuria on macro only.  He has no other changes in voiding in addition to hematuria.  He has intermittent pelvic pain for several months.  It is mild.  There is no obvious aggravating or relieving factors.   He has no history of kidney stone.  He denies any trauma.  Recent UA showed 0-2 rbc/hpf.  Recent CT scan was also normal.  Past Medical History:   Diagnosis Date    A-fib (H) 2008    History of cardioversion 2008, 2012    Vertigo      Current Outpatient Medications   Medication Sig Dispense Refill    dextromethorphan-guaiFENesin (MUCINEX DM)  MG 12 hr tablet Take 1 tablet by mouth every 12 hours.      metoprolol succinate ER (TOPROL XL) 25 MG 24 hr tablet TAKE 1 TABLET(25 MG) BY MOUTH DAILY (Patient not taking: Reported on 4/8/2025) 90 tablet 0    olopatadine (PATANOL) 0.1 % ophthalmic solution Place 1 drop into both eyes 2 times daily. (Patient not taking: Reported on 4/8/2025) 5 mL 11    ondansetron (ZOFRAN) 4 MG tablet Take 1 tablet (4 mg) by mouth every 8 hours as needed for nausea (Patient not taking: Reported on 4/8/2025) 9 tablet 1    sildenafil (REVATIO) 20 MG tablet Take 1 tablet (20 mg) by mouth daily as needed (45 minutes before sexual activity) (Patient not taking: Reported on 4/8/2025) 30 tablet 11     Past Surgical History:   Procedure Laterality Date    CARDIOVERSION  2022    a-fib    COLONOSCOPY      ESOPHAGOSCOPY, GASTROSCOPY, DUODENOSCOPY (EGD), COMBINED  01/07/2014    Procedure: COMBINED ESOPHAGOSCOPY, GASTROSCOPY, DUODENOSCOPY (EGD), BIOPSY SINGLE OR MULTIPLE;  EGD-UPPER GI SYPTOMS / LEISA;  Surgeon: Mukund Au MD;  Location: [facility]    ESOPHAGOSCOPY, GASTROSCOPY, DUODENOSCOPY (EGD), COMBINED N/A 6/10/2024    Procedure: ESOPHAGOGASTRODUODENOSCOPY, WITH FINE NEEDLE ASPIRATION BIOPSY, WITH ENDOSCOPIC ULTRASOUND  GUIDANCE;  Surgeon: Guru Libby Reyes MD;  Location: [facility] OR    TONSILLECTOMY & ADENOIDECTOMY        Social History     Socioeconomic History    Marital status:      Spouse name: Not on file    Number of children: 2    Years of education: Not on file    Highest education level: Not on file   Occupational History    Occupation:    Tobacco Use    Smoking status: Never     Passive exposure: Never    Smokeless tobacco: Never    Tobacco comments:     no 2nd hand smoke exposure   Vaping Use    Vaping status: Never Used   Substance and Sexual Activity    Alcohol use: Yes     Comment: 1/2 bottle of wine on Friday and Saturday evenings    Drug use: Never    Sexual activity: Yes     Partners: Female   Other Topics Concern    Parent/sibling w/ CABG, MI or angioplasty before 65F 55M? Not Asked     Service No    Blood Transfusions No    Caffeine Concern No    Occupational Exposure No    Hobby Hazards No    Sleep Concern No    Stress Concern No    Weight Concern No    Special Diet No    Back Care No    Exercise No    Bike Helmet No    Seat Belt No    Self-Exams No   Social History Narrative    Not on file     Social Drivers of Health     Financial Resource Strain: Low Risk  (4/30/2024)    Financial Resource Strain     Within the past 12 months, have you or your family members you live with been unable to get utilities (heat, electricity) when it was really needed?: No   Food Insecurity: Low Risk  (4/30/2024)    Food Insecurity     Within the past 12 months, did you worry that your food would run out before you got money to buy more?: No     Within the past 12 months, did the food you bought just not last and you didn t have money to get more?: No   Transportation Needs: Low Risk  (4/30/2024)    Transportation Needs     Within the past 12 months, has lack of transportation kept you from medical appointments, getting your medicines, non-medical meetings or appointments, work, or from getting  things that you need?: No   Physical Activity: Sufficiently Active (4/30/2024)    Exercise Vital Sign     Days of Exercise per Week: 4 days     Minutes of Exercise per Session: 40 min   Stress: No Stress Concern Present (4/30/2024)    [employer] - Occupational Stress Questionnaire     Feeling of Stress : Only a little   Social Connections: Unknown (4/30/2024)    Social Connection and Isolation Panel [NHANES]     Frequency of Communication with Friends and Family: Not on file     Frequency of Social Gatherings with Friends and Family: Once a week     Attends Sikhism Services: Not on file     Active Member of Clubs or Organizations: Not on file     Attends Club or Organization Meetings: Not on file     Marital Status: Not on file   Interpersonal Safety: Low Risk  (1/22/2025)    Interpersonal Safety     Do you feel physically and emotionally safe where you currently live?: Yes     Within the past 12 months, have you been hit, slapped, kicked or otherwise physically hurt by someone?: No     Within the past 12 months, have you been humiliated or emotionally abused in other ways by your partner or ex-partner?: No   Housing Stability: Low Risk  (4/30/2024)    Housing Stability     Do you have housing? : Yes     Are you worried about losing your housing?: No     Family History   Problem Relation Age of Onset    Cancer Mother         lymphoma    Thyroid Disease Mother         Thyroid Removal    Cerebrovascular Disease Paternal Grandmother     Cerebrovascular Disease Paternal Grandfather     Diabetes No family hx of     Glaucoma No family hx of     Macular Degeneration No family hx of     Anesthesia Reaction No family hx of     Thrombosis No family hx of     Hypertension No family hx of           REVIEW OF SYSTEMS  =================  C: NEGATIVE for fever, chills, change in weight  I: NEGATIVE for worrisome rashes, moles or lesions  E/M: NEGATIVE for ear, mouth and throat problems  R: NEGATIVE  "for significant cough or SHORTNESS OF BREATH  CV:  NEGATIVE for chest pain, palpitations or peripheral edema  GI: NEGATIVE for nausea, abdominal pain, heartburn, or change in bowel habits  NEURO: NEGATIVE numbness/weakness  : see HPI  PSYCH: NEGATIVE depression/anxiety  LYmph: no new enlarged lymph nodes  Ortho: no new trauma/movements           O: Exam:BP (!) 163/83 (BP Location: Right arm, Patient Position: Sitting, Cuff Size: Adult Regular)   Pulse 71   Ht 1.778 m (5' 10\")   Wt 94.3 kg (208 lb)   SpO2 95%   BMI 29.84 kg/m     Constitutional: healthy, alert and no distress  GENERAL: alert and no distress  EYES: Eyes grossly normal to inspection.  No discharge or erythema, or obvious scleral/conjunctival abnormalities.  RESP: No audible wheeze, cough, or visible cyanosis.    SKIN: Visible skin clear. No significant rash, abnormal pigmentation or lesions.  NEURO: Cranial nerves grossly intact.  Mentation and speech appropriate for age.  PSYCH: Appropriate affect, tone, and pace of words     Study Result    Narrative & Impression   EXAM: CT ABDOMEN PELVIS W CONTRAST  LOCATION: [facility]  DATE: 2/11/2025     INDICATION: intermittent RLQ pain for 1 month, some bilateral groin pain also   hernia?  COMPARISON: 05/10/2024.  TECHNIQUE: CT scan of the abdomen and pelvis was performed following injection of IV contrast. Multiplanar reformats were obtained. Dose reduction techniques were used.  CONTRAST: 127 mL Isovue-370.     FINDINGS:   LOWER CHEST: Mild atelectasis in both lung bases.     HEPATOBILIARY: Normal.     PANCREAS: No significant change in size and appearance of the 2.5 x 2.1 cm low-density mass at the head of the pancreas. There is no pancreatic ductal dilation or atrophy through the body and tail of the pancreas. As noted previously MRI would be   recommended to better evaluate this. This may be approachable by endoscopic ultrasound as well.     SPLEEN: Normal.     ADRENAL GLANDS: " No significant change in 20 mm likely adenoma in the right adrenal gland. The left adrenal is unremarkable.     KIDNEYS/BLADDER: Normal.     BOWEL: Normal.     LYMPH NODES: Normal.     VASCULATURE: Incidental note is made of a retroaortic left renal vein.     PELVIC ORGANS: No free fluid or adenopathy in the pelvis. Small fat-containing left inguinal hernia.     MUSCULOSKELETAL: Normal.                                                                      IMPRESSION:      1.  No significant change in low-density mass at the head of the pancreas. As noted previously, MRI would be recommended to better evaluate this. This may be approachable by endoscopic ultrasound as well.   2.  Stable likely adenoma in the right adrenal gland.  3.  No findings to explain right lower quadrant pain.       Assessment/Plan    Microscopic  Hematuria:  only in macro test.  Normal CT scan .  No further evaluation needed.  Pelvic pain:  suspect pelvic muscle problem.  Discussed physical therapy if symptoms persist  BPH: mild .  Prn  Elevated bp: Sharath to follow up with Primary Care provider regarding elevated blood pressure.

## 2025-04-10 ENCOUNTER — TELEPHONE (OUTPATIENT)
Dept: GASTROENTEROLOGY | Facility: CLINIC | Age: 65
End: 2025-04-10
Payer: COMMERCIAL

## 2025-04-10 NOTE — TELEPHONE ENCOUNTER
"Endoscopy Scheduling Screen    Have you had any respiratory illness or flu-like symptoms in the last 10 days?  No    What is your communication preference for Instructions and/or Bowel Prep?   MyChart    What insurance is in the chart?  Other:  [employer] medicare    Ordering/Referring Provider: AMANDEEP ASHU   (If ordering provider performs procedure, schedule with ordering provider unless otherwise instructed. )    BMI: Estimated body mass index is 29.84 kg/m  as calculated from the following:    Height as of 4/8/25: 1.778 m (5' 10\").    Weight as of 4/8/25: 94.3 kg (208 lb).     Sedation Ordered  moderate sedation.   If patient BMI > 50 do not schedule in [facility].    If patient BMI > 45 do not schedule at [facility].    Are you taking methadone or Suboxone?  NO, No RN review required.    Have you been diagnosed and are being treated for severe PTSD or severe anxiety?  NO, No RN review required.    Are you taking any prescription medications for pain 3 or more times per week?   NO, No RN review required.    Do you have a history of malignant hyperthermia?  No    (Females) Are you currently pregnant?        Have you been diagnosed with pulmonary hypertension?   No    Do you have an LVAD?  No    Have you been told you have moderate to severe sleep apnea?  No.    Have you been told you have COPD, asthma, or any other lung disease?  No    Has your doctor ordered any cardiac tests like echo, angiogram, stress test, ablation, or EKG, that you have not completed yet?  No    Do you  have any history of heart conditions?  Yes   afib    Have you had any hospitalizations  in the last year for heart related issues, for example a stent placement, heart attack, or cardiomyopathy?  No    Do you have any implantable devices in your body (pacemaker, ICD)?  No    Do you take nitroglycerine?  No    Have you ever had or are you waiting for an organ transplant?  No. Continue scheduling, no site restrictions.    In the last 2 years, have you had a " "stroke or transient ischemic attack (TIA aka \"mini stroke\")?   No.    Have you been diagnosed with cirrhosis of the liver?   No.    Are you currently on dialysis?   No    Do you need help moving from a bed to a chair?   No    BMI: Estimated body mass index is 29.84 kg/m  as calculated from the following:    Height as of 4/8/25: 1.778 m (5' 10\").    Weight as of 4/8/25: 94.3 kg (208 lb).     Is patients BMI > 40 and scheduling location [facility]?  No    Do you take an injectable or oral medication for weight loss or diabetes (excluding insulin)?  No    Do you take the medication Naltrexone?  No    Do you take blood thinners?  No       Prep   Are you currently on dialysis or do you have chronic kidney disease?  No    Do you have a diagnosis of diabetes?  No    Do you have a diagnosis of cystic fibrosis (CF)?  No    On a regular basis do you go 3 -5 days between bowel movements?  No    BMI > 40?  No    Preferred Pharmacy:    [id number] [place] [address]  AT [address] [facility] [place] [address] [facility] [address]  Phone: [phone] Fax: [phone]    Final Scheduling Details     Procedure scheduled  Colonoscopy    Surgeon:  LUCIA     Date of procedure:  4/29/25     Pre-OP / PAC:   No - Not required for this site.    Location  [facility] - [facility] - Patient preference.    Sedation   Moderate Sedation - Per order.      Patient Reminders:   You will receive a call from a Nurse to review instructions and health history.  This assessment must be completed prior to your procedure.  Failure to complete the Nurse assessment may result in the procedure being cancelled.      On the day of your procedure, please designate an adult(s) who can drive you home stay with you for the next 24 hours. The medicines used in the exam will make you sleepy. You will not be able to drive.      You cannot take public transportation, ride share services, or non-medical taxi service without a " responsible caregiver.  Medical transport services are allowed with the requirement that a responsible caregiver will receive you at your destination.  We require that drivers and caregivers are confirmed prior to your procedure.

## 2025-04-11 PROBLEM — M25.511 CHRONIC RIGHT SHOULDER PAIN: Status: ACTIVE | Noted: 2025-04-11

## 2025-04-11 PROBLEM — G89.29 CHRONIC RIGHT SHOULDER PAIN: Status: ACTIVE | Noted: 2025-04-11

## 2025-04-14 ENCOUNTER — TELEPHONE (OUTPATIENT)
Dept: GASTROENTEROLOGY | Facility: CLINIC | Age: 65
End: 2025-04-14
Payer: COMMERCIAL

## 2025-04-14 NOTE — TELEPHONE ENCOUNTER
Attempted to contact patient in order to complete pre assessment questions.     No answer. Left message to return call to [phone] option 3.    Callback communication sent via [employer].    Elmira Nelson LPN

## 2025-04-14 NOTE — TELEPHONE ENCOUNTER
Pre visit planning completed.      Procedure details:    Patient scheduled for Colonoscopy on 4/29/25.     Approximate arrival time: 0725. Procedure time 0810.   *Ensure patient is aware that endoscopy team will be calling about 2 days prior to procedure date to confirm arrival time as this may change.     Facility location: [facility]; [address], [place] [address]. Check in location: 2nd Floor at Surgery desk.  *Disclaimer: Drivers are to check in with patient and stay on campus during procedure.     Sedation type: Conscious sedation  Discuss sedation- previous done with [facility] ([facility])    Pre op exam needed? No.    Indication for procedure:   Family history of colon cancer   Family history of cancer in grandfather   Family history of cancer in father         Chart review:     Electronic implanted devices? No    Recent diagnosis of diverticulitis within the last 6 weeks? No      Medication review:    Diabetic? No    Anticoagulants? No    Weight loss medication/injectable? No GLP-1 medication per patient's medication list. Nursing to verify with pre-assessment call.    Other medication HOLDING recommendations:  N/A      Prep for procedure:     Bowel prep recommendation: Standard Miralax.   Due to: standard bowel prep    Procedure information and instructions sent via Minusleo Zuñiga RN  Endoscopy Procedure Pre Assessment   [phone] option 3

## 2025-04-14 NOTE — TELEPHONE ENCOUNTER
Pre assessment completed for upcoming procedure.   (Please see previous telephone encounter notes for complete details)    Patient returned call.     Procedure details:    Approximate time and facility location reviewed.   Patient is aware that endoscopy team will be calling about 2 days prior to confirm arrival time.    Designated  policy reviewed and that site requests drivers to check in and stay on campus. Instructed to have someone stay 6  hours post procedure.   *Disclaimer - please notify the [facility] RN GI staff with any  issues/concerns.    Medication review:    Medications reviewed. Please see supporting documentation below. Holding recommendations discussed (if applicable).     Prep for procedure:     Procedure prep instructions reviewed.      Any additional information needed:  N/A    Patient verbalized understanding and had no questions or concerns at this time.      Stephanie Valero RN  Endoscopy Procedure Pre Assessment   [phone] option 3

## 2025-04-15 ENCOUNTER — PATIENT OUTREACH (OUTPATIENT)
Dept: CARE COORDINATION | Facility: CLINIC | Age: 65
End: 2025-04-15
Payer: COMMERCIAL

## 2025-04-29 ENCOUNTER — HOSPITAL ENCOUNTER (OUTPATIENT)
Facility: AMBULATORY SURGERY CENTER | Age: 65
Discharge: HOME | End: 2025-04-29
Attending: COLON & RECTAL SURGERY | Admitting: COLON & RECTAL SURGERY
Payer: COMMERCIAL

## 2025-04-29 VITALS
OXYGEN SATURATION: 96 % | BODY MASS INDEX: 29.84 KG/M2 | RESPIRATION RATE: 18 BRPM | TEMPERATURE: 97.2 F | SYSTOLIC BLOOD PRESSURE: 133 MMHG | WEIGHT: 208 LBS | HEART RATE: 60 BPM | DIASTOLIC BLOOD PRESSURE: 90 MMHG

## 2025-04-29 LAB — COLONOSCOPY: NORMAL

## 2025-04-29 PROCEDURE — G8918 PT W/O PREOP ORDER IV AB PRO: HCPCS

## 2025-04-29 PROCEDURE — G8907 PT DOC NO EVENTS ON DISCHARG: HCPCS

## 2025-04-29 PROCEDURE — 45378 DIAGNOSTIC COLONOSCOPY: CPT

## 2025-04-29 RX ORDER — DIPHENHYDRAMINE HYDROCHLORIDE 50 MG/ML
INJECTION, SOLUTION INTRAMUSCULAR; INTRAVENOUS PRN
Status: DISCONTINUED | OUTPATIENT
Start: 2025-04-29 | End: 2025-04-29 | Stop reason: HOSPADM

## 2025-04-29 RX ORDER — FLUMAZENIL 0.1 MG/ML
0.2 INJECTION, SOLUTION INTRAVENOUS
Status: ACTIVE | OUTPATIENT
Start: 2025-04-29 | End: 2025-04-29

## 2025-04-29 RX ORDER — LIDOCAINE 40 MG/G
CREAM TOPICAL
Status: DISCONTINUED | OUTPATIENT
Start: 2025-04-29 | End: 2025-04-30 | Stop reason: HOSPADM

## 2025-04-29 RX ORDER — NALOXONE HYDROCHLORIDE 0.4 MG/ML
0.4 INJECTION, SOLUTION INTRAMUSCULAR; INTRAVENOUS; SUBCUTANEOUS
Status: DISCONTINUED | OUTPATIENT
Start: 2025-04-29 | End: 2025-04-30 | Stop reason: HOSPADM

## 2025-04-29 RX ORDER — FENTANYL CITRATE 50 UG/ML
INJECTION, SOLUTION INTRAMUSCULAR; INTRAVENOUS PRN
Status: DISCONTINUED | OUTPATIENT
Start: 2025-04-29 | End: 2025-04-29 | Stop reason: HOSPADM

## 2025-04-29 RX ORDER — NALOXONE HYDROCHLORIDE 0.4 MG/ML
0.2 INJECTION, SOLUTION INTRAMUSCULAR; INTRAVENOUS; SUBCUTANEOUS
Status: DISCONTINUED | OUTPATIENT
Start: 2025-04-29 | End: 2025-04-30 | Stop reason: HOSPADM

## 2025-04-29 RX ORDER — PROCHLORPERAZINE MALEATE 5 MG/1
5 TABLET ORAL EVERY 6 HOURS PRN
Status: DISCONTINUED | OUTPATIENT
Start: 2025-04-29 | End: 2025-04-30 | Stop reason: HOSPADM

## 2025-04-29 RX ORDER — ONDANSETRON 2 MG/ML
4 INJECTION INTRAMUSCULAR; INTRAVENOUS
Status: DISCONTINUED | OUTPATIENT
Start: 2025-04-29 | End: 2025-04-30 | Stop reason: HOSPADM

## 2025-04-29 RX ORDER — SODIUM CHLORIDE, SODIUM LACTATE, POTASSIUM CHLORIDE, CALCIUM CHLORIDE 600; 310; 30; 20 MG/100ML; MG/100ML; MG/100ML; MG/100ML
INJECTION, SOLUTION INTRAVENOUS CONTINUOUS
Status: DISCONTINUED | OUTPATIENT
Start: 2025-04-29 | End: 2025-04-30 | Stop reason: HOSPADM

## 2025-04-29 RX ORDER — ONDANSETRON 4 MG/1
4 TABLET, ORALLY DISINTEGRATING ORAL EVERY 6 HOURS PRN
Status: DISCONTINUED | OUTPATIENT
Start: 2025-04-29 | End: 2025-04-30 | Stop reason: HOSPADM

## 2025-04-29 RX ORDER — ONDANSETRON 2 MG/ML
4 INJECTION INTRAMUSCULAR; INTRAVENOUS EVERY 6 HOURS PRN
Status: DISCONTINUED | OUTPATIENT
Start: 2025-04-29 | End: 2025-04-30 | Stop reason: HOSPADM

## 2025-04-29 NOTE — H&P
Pre-Endoscopy History and Physical     Edin Padilla MRN# [id number]   YOB: 1960 Age: 64 year old     Date of Procedure: 4/29/2025  Primary care provider: Amado Watson  Type of Endoscopy: colonoscopy  Reason for Procedure: Lower abdominal pain, family history  Type of Anesthesia Anticipated: Moderate (conscious) sedation  [id number]}    HPI:    Edin is a 64 year old male who will be undergoing the above procedure.      A history and physical has been performed. The patient's medications and allergies have been reviewed. The risks and benefits of the procedure and the sedation options and risks were discussed with the patient.  All questions were answered and informed consent was obtained.      He denies a personal or family history of anesthesia complications or bleeding disorders.     Allergies   Allergen Reactions    Other [Seasonal Allergies]      Hayfever. Eye irritation. Nasal discharge.        Current Outpatient Medications   Medication Sig Dispense Refill    dextromethorphan-guaiFENesin (MUCINEX DM)  MG 12 hr tablet Take 1 tablet by mouth every 12 hours.      metoprolol succinate ER (TOPROL XL) 25 MG 24 hr tablet TAKE 1 TABLET(25 MG) BY MOUTH DAILY (Patient not taking: Reported on 4/8/2025) 90 tablet 0    olopatadine (PATANOL) 0.1 % ophthalmic solution Place 1 drop into both eyes 2 times daily. (Patient not taking: Reported on 4/8/2025) 5 mL 11    ondansetron (ZOFRAN) 4 MG tablet Take 1 tablet (4 mg) by mouth every 8 hours as needed for nausea (Patient not taking: Reported on 4/8/2025) 9 tablet 1    sildenafil (REVATIO) 20 MG tablet Take 1 tablet (20 mg) by mouth daily as needed (45 minutes before sexual activity) (Patient not taking: Reported on 4/8/2025) 30 tablet 11     Current Facility-Administered Medications   Medication Dose Route Frequency Provider Last Rate Last Admin    lactated ringers infusion   Intravenous Continuous Sofia North MD        lidocaine (LMX4)  kit   Topical Q1H PRN Sofia North MD        lidocaine 1 % 0.1-1 mL  0.1-1 mL Other Q1H PRN Sofia North MD        ondansetron (ZOFRAN) injection 4 mg  4 mg Intravenous Once PRN Sofia North MD        sodium chloride (PF) 0.9% PF flush 3 mL  3 mL Intracatheter Q8H Sofia North MD        sodium chloride (PF) 0.9% PF flush 3 mL  3 mL Intracatheter q1 min prn Sofia North MD           Patient Active Problem List   Diagnosis    GERD (gastroesophageal reflux disease)    PAF (paroxysmal atrial fibrillation) (H)    Esophageal reflux    Non-toxic multinodular goiter    Hemorrhoids    Benign positional vertigo    Chronic right shoulder pain        Past Medical History:   Diagnosis Date    A-fib (H) 2008    History of cardioversion 2008, 2012    Vertigo         Past Surgical History:   Procedure Laterality Date    CARDIOVERSION  2022    a-fib    COLONOSCOPY      ESOPHAGOSCOPY, GASTROSCOPY, DUODENOSCOPY (EGD), COMBINED  01/07/2014    Procedure: COMBINED ESOPHAGOSCOPY, GASTROSCOPY, DUODENOSCOPY (EGD), BIOPSY SINGLE OR MULTIPLE;  EGD-UPPER GI SYPTOMS / LEISA;  Surgeon: Mukund Au MD;  Location:  OR    ESOPHAGOSCOPY, GASTROSCOPY, DUODENOSCOPY (EGD), COMBINED N/A 6/10/2024    Procedure: ESOPHAGOGASTRODUODENOSCOPY, WITH FINE NEEDLE ASPIRATION BIOPSY, WITH ENDOSCOPIC ULTRASOUND GUIDANCE;  Surgeon: Guru Libby Reyes MD;  Location: [facility] OR    TONSILLECTOMY & ADENOIDECTOMY         Social History     Tobacco Use    Smoking status: Never     Passive exposure: Never    Smokeless tobacco: Never    Tobacco comments:     no 2nd hand smoke exposure   Substance Use Topics    Alcohol use: Yes     Comment: 1/2 bottle of wine on Friday and Saturday evenings       Family History   Problem Relation Age of Onset    Cancer Mother         lymphoma    Thyroid Disease Mother         Thyroid Removal    Cerebrovascular Disease Paternal Grandmother     Cerebrovascular Disease Paternal  "Grandfather     Diabetes No family hx of     Glaucoma No family hx of     Macular Degeneration No family hx of     Anesthesia Reaction No family hx of     Thrombosis No family hx of     Hypertension No family hx of        REVIEW OF SYSTEMS:     5 point ROS negative except as noted above in HPI, including Gen., Resp., CV, GI &  system review.    PHYSICAL EXAM:   BP (!) 144/90   Temp 96.8  F (36  C) (Temporal)   Resp 16   Wt 94.3 kg (208 lb)   SpO2 98%   BMI 29.84 kg/m   Estimated body mass index is 29.84 kg/m  as calculated from the following:    Height as of 4/8/25: 1.778 m (5' 10\").    Weight as of this encounter: 94.3 kg (208 lb).   GENERAL APPEARANCE: healthy, alert, active, and no distress  MENTAL STATUS: alert and oriented x 3  AIRWAY EXAM: Mallampatti Class II (visualization of the soft palate, fauces, and uvula)  RESP: lungs clear to auscultation - no rales, rhonchi or wheezes  CV: regular rates and rhythm and normal S1 S2, no S3 or S4      DIAGNOSTICS:    Not indicated      IMPRESSION   ASA Class 2 - Mild systemic disease        PLAN:       Colonoscopy    The above has been forwarded to the consulting provider.      Signed Electronically by: Sofia North MD  April 29, 2025    .        "

## 2025-06-11 ENCOUNTER — TELEPHONE (OUTPATIENT)
Dept: FAMILY MEDICINE | Facility: CLINIC | Age: 65
End: 2025-06-11
Payer: COMMERCIAL

## 2025-06-11 NOTE — TELEPHONE ENCOUNTER
Patient Quality Outreach    Patient is due for the following:   Physical Annual Wellness Visit      Topic Date Due    Pneumococcal Vaccine (1 of 2 - PCV) Never done    COVID-19 Vaccine (8 - 2024-25 season) 04/10/2025       Action(s) Taken:   Schedule a Annual Wellness Visit    Type of outreach:    Sent Paktor message.    Questions for provider review:    None         Lorene Jordan CMA  Chart routed to Care Team.

## 2025-06-21 ENCOUNTER — HEALTH MAINTENANCE LETTER (OUTPATIENT)
Age: 65
End: 2025-06-21

## 2025-08-25 ENCOUNTER — OFFICE VISIT (OUTPATIENT)
Dept: CARDIOLOGY | Facility: CLINIC | Age: 65
End: 2025-08-25
Attending: FAMILY MEDICINE
Payer: COMMERCIAL

## 2025-08-25 VITALS
SYSTOLIC BLOOD PRESSURE: 133 MMHG | DIASTOLIC BLOOD PRESSURE: 87 MMHG | HEART RATE: 66 BPM | WEIGHT: 210 LBS | OXYGEN SATURATION: 99 % | BODY MASS INDEX: 30.13 KG/M2

## 2025-08-25 DIAGNOSIS — Z86.79 HISTORY OF ATRIAL FIBRILLATION: ICD-10-CM

## 2025-08-25 DIAGNOSIS — I48.0 PAROXYSMAL ATRIAL FIBRILLATION (H): Primary | ICD-10-CM

## 2025-08-25 PROCEDURE — 99203 OFFICE O/P NEW LOW 30 MIN: CPT | Performed by: INTERNAL MEDICINE

## 2025-08-25 PROCEDURE — 3075F SYST BP GE 130 - 139MM HG: CPT | Performed by: INTERNAL MEDICINE

## 2025-08-25 PROCEDURE — 3079F DIAST BP 80-89 MM HG: CPT | Performed by: INTERNAL MEDICINE

## 2025-08-25 RX ORDER — METOPROLOL TARTRATE 50 MG
TABLET ORAL
Qty: 30 TABLET | Refills: 2 | Status: SHIPPED | OUTPATIENT
Start: 2025-08-25

## (undated) DEVICE — PAD CHUX UNDERPAD 23X24" 7136

## (undated) DEVICE — ECHOTIP ACUCORE

## (undated) DEVICE — ENDO TUBING CO2 SMARTCAP STERILE DISP 100145CO2EXT

## (undated) DEVICE — SUCTION MANIFOLD NEPTUNE 2 SYS 4 PORT 0702-020-000

## (undated) DEVICE — PREP CHLORAPREP 26ML TINTED ORANGE  260815

## (undated) DEVICE — KIT ENDO FIRST STEP DISINFECTANT 200ML W/POUCH EP-4

## (undated) DEVICE — LIFTER SURGICAL ASCENDO SUBMUCOSAL LIFT AGENT BX00712934

## (undated) DEVICE — ENDO BITE BLOCK ADULT OMNI-BLOC

## (undated) DEVICE — ENDO NDL BALL TIP ULTRASOUND 22GA 5.2FR ECHO-3-22

## (undated) DEVICE — ENDO PROBE COVER ULTRASOUND BALLOON LATEX  MAJ-249

## (undated) DEVICE — PACK ENDOSCOPY GI CUSTOM UMMC

## (undated) DEVICE — SOL WATER IRRIG 1000ML BOTTLE 2F7114

## (undated) RX ORDER — ONDANSETRON 2 MG/ML
INJECTION INTRAMUSCULAR; INTRAVENOUS
Status: DISPENSED
Start: 2024-06-10

## (undated) RX ORDER — FENTANYL CITRATE 50 UG/ML
INJECTION, SOLUTION INTRAMUSCULAR; INTRAVENOUS
Status: DISPENSED
Start: 2025-04-29

## (undated) RX ORDER — FENTANYL CITRATE 50 UG/ML
INJECTION, SOLUTION INTRAMUSCULAR; INTRAVENOUS
Status: DISPENSED
Start: 2024-06-10

## (undated) RX ORDER — PROPOFOL 10 MG/ML
INJECTION, EMULSION INTRAVENOUS
Status: DISPENSED
Start: 2024-06-10

## (undated) RX ORDER — DIPHENHYDRAMINE HYDROCHLORIDE 50 MG/ML
INJECTION, SOLUTION INTRAMUSCULAR; INTRAVENOUS
Status: DISPENSED
Start: 2025-04-29